# Patient Record
Sex: MALE | Race: OTHER | HISPANIC OR LATINO
[De-identification: names, ages, dates, MRNs, and addresses within clinical notes are randomized per-mention and may not be internally consistent; named-entity substitution may affect disease eponyms.]

---

## 2017-04-25 ENCOUNTER — APPOINTMENT (OUTPATIENT)
Dept: ORTHOPEDIC SURGERY | Facility: CLINIC | Age: 22
End: 2017-04-25

## 2017-04-25 VITALS
HEIGHT: 69 IN | WEIGHT: 196 LBS | BODY MASS INDEX: 29.03 KG/M2 | DIASTOLIC BLOOD PRESSURE: 76 MMHG | HEART RATE: 66 BPM | SYSTOLIC BLOOD PRESSURE: 126 MMHG

## 2017-04-25 DIAGNOSIS — S93.421A SPRAIN OF DELTOID LIGAMENT OF RIGHT ANKLE, INITIAL ENCOUNTER: ICD-10-CM

## 2017-04-25 DIAGNOSIS — Z78.9 OTHER SPECIFIED HEALTH STATUS: ICD-10-CM

## 2017-05-09 PROBLEM — Z78.9 DOES NOT USE ILLICIT DRUGS: Status: ACTIVE | Noted: 2017-04-25

## 2017-05-09 PROBLEM — Z78.9 EXERCISES DAILY: Status: ACTIVE | Noted: 2017-04-25

## 2017-05-09 PROBLEM — Z78.9 DENIES ALCOHOL CONSUMPTION: Status: ACTIVE | Noted: 2017-04-25

## 2017-06-01 ENCOUNTER — RESULT REVIEW (OUTPATIENT)
Age: 22
End: 2017-06-01

## 2017-06-14 ENCOUNTER — APPOINTMENT (OUTPATIENT)
Dept: ORTHOPEDIC SURGERY | Facility: CLINIC | Age: 22
End: 2017-06-14

## 2017-06-14 DIAGNOSIS — M21.6X9 OTHER ACQUIRED DEFORMITIES OF UNSPECIFIED FOOT: ICD-10-CM

## 2017-11-28 ENCOUNTER — OUTPATIENT (OUTPATIENT)
Dept: OUTPATIENT SERVICES | Facility: HOSPITAL | Age: 22
LOS: 1 days | End: 2017-11-28
Payer: MEDICAID

## 2017-11-28 VITALS
DIASTOLIC BLOOD PRESSURE: 76 MMHG | HEART RATE: 70 BPM | HEIGHT: 69 IN | SYSTOLIC BLOOD PRESSURE: 111 MMHG | OXYGEN SATURATION: 99 % | WEIGHT: 188.94 LBS | TEMPERATURE: 98 F | RESPIRATION RATE: 16 BRPM

## 2017-11-28 DIAGNOSIS — M95.8 OTHER SPECIFIED ACQUIRED DEFORMITIES OF MUSCULOSKELETAL SYSTEM: ICD-10-CM

## 2017-11-28 DIAGNOSIS — Z01.818 ENCOUNTER FOR OTHER PREPROCEDURAL EXAMINATION: ICD-10-CM

## 2017-11-28 DIAGNOSIS — M42.9: ICD-10-CM

## 2017-11-28 DIAGNOSIS — M77.51 OTHER ENTHESOPATHY OF RIGHT FOOT AND ANKLE: ICD-10-CM

## 2017-11-28 DIAGNOSIS — S99.919A UNSPECIFIED INJURY OF UNSPECIFIED ANKLE, INITIAL ENCOUNTER: ICD-10-CM

## 2017-11-28 LAB
HCT VFR BLD CALC: 46 % — SIGNIFICANT CHANGE UP (ref 39–50)
HGB BLD-MCNC: 15.6 G/DL — SIGNIFICANT CHANGE UP (ref 13–17)
MCHC RBC-ENTMCNC: 28 PG — SIGNIFICANT CHANGE UP (ref 27–34)
MCHC RBC-ENTMCNC: 33.9 GM/DL — SIGNIFICANT CHANGE UP (ref 32–36)
MCV RBC AUTO: 82.6 FL — SIGNIFICANT CHANGE UP (ref 80–100)
PLATELET # BLD AUTO: 275 K/UL — SIGNIFICANT CHANGE UP (ref 150–400)
RBC # BLD: 5.57 M/UL — SIGNIFICANT CHANGE UP (ref 4.2–5.8)
RBC # FLD: 13.2 % — SIGNIFICANT CHANGE UP (ref 10.3–14.5)
WBC # BLD: 7.72 K/UL — SIGNIFICANT CHANGE UP (ref 3.8–10.5)
WBC # FLD AUTO: 7.72 K/UL — SIGNIFICANT CHANGE UP (ref 3.8–10.5)

## 2017-11-28 PROCEDURE — G0463: CPT

## 2017-11-28 PROCEDURE — 85027 COMPLETE CBC AUTOMATED: CPT

## 2017-11-28 RX ORDER — ACETAMINOPHEN 500 MG
975 TABLET ORAL ONCE
Qty: 0 | Refills: 0 | Status: COMPLETED | OUTPATIENT
Start: 2017-12-01 | End: 2017-12-01

## 2017-11-28 RX ORDER — SODIUM CHLORIDE 9 MG/ML
3 INJECTION INTRAMUSCULAR; INTRAVENOUS; SUBCUTANEOUS EVERY 8 HOURS
Qty: 0 | Refills: 0 | Status: DISCONTINUED | OUTPATIENT
Start: 2017-12-01 | End: 2017-12-16

## 2017-11-28 RX ORDER — LIDOCAINE HCL 20 MG/ML
0.2 VIAL (ML) INJECTION ONCE
Qty: 0 | Refills: 0 | Status: DISCONTINUED | OUTPATIENT
Start: 2017-12-01 | End: 2017-12-16

## 2017-11-28 NOTE — H&P PST ADULT - HISTORY OF PRESENT ILLNESS
This is a 23 y/o male s/p ('2016) Right ankle "sprain" while running. No surgery. Progressively worsening Right ankle pain since. Re-evaluated (5/2017) dx: Right Ankle internal derangement. Scheduled: Right Ankle Operative Arthroscopy/ Potential Microfracture Technique.

## 2017-11-28 NOTE — H&P PST ADULT - NSANTHOSAYNRD_GEN_A_CORE
No. MILI screening performed.  STOP BANG Legend: 0-2 = LOW Risk; 3-4 = INTERMEDIATE Risk; 5-8 = HIGH Risk No. MILI screening performed.  STOP BANG Legend: 0-2 = LOW Risk; 3-4 = INTERMEDIATE Risk; 5-8 = HIGH Risk/Neck 15 in.

## 2017-12-01 ENCOUNTER — APPOINTMENT (OUTPATIENT)
Dept: ORTHOPEDIC SURGERY | Facility: HOSPITAL | Age: 22
End: 2017-12-01

## 2017-12-01 ENCOUNTER — OUTPATIENT (OUTPATIENT)
Dept: OUTPATIENT SERVICES | Facility: HOSPITAL | Age: 22
LOS: 1 days | End: 2017-12-01
Payer: MEDICAID

## 2017-12-01 ENCOUNTER — RESULT REVIEW (OUTPATIENT)
Age: 22
End: 2017-12-01

## 2017-12-01 VITALS
OXYGEN SATURATION: 99 % | HEART RATE: 70 BPM | HEIGHT: 69 IN | SYSTOLIC BLOOD PRESSURE: 125 MMHG | WEIGHT: 188.94 LBS | RESPIRATION RATE: 16 BRPM | TEMPERATURE: 98 F | DIASTOLIC BLOOD PRESSURE: 79 MMHG

## 2017-12-01 VITALS
RESPIRATION RATE: 16 BRPM | TEMPERATURE: 98 F | OXYGEN SATURATION: 100 % | HEART RATE: 70 BPM | DIASTOLIC BLOOD PRESSURE: 71 MMHG | SYSTOLIC BLOOD PRESSURE: 120 MMHG

## 2017-12-01 DIAGNOSIS — M77.51 OTHER ENTHESOPATHY OF RIGHT FOOT AND ANKLE: ICD-10-CM

## 2017-12-01 DIAGNOSIS — M42.9: ICD-10-CM

## 2017-12-01 DIAGNOSIS — M95.8 OTHER SPECIFIED ACQUIRED DEFORMITIES OF MUSCULOSKELETAL SYSTEM: ICD-10-CM

## 2017-12-01 DIAGNOSIS — Z01.818 ENCOUNTER FOR OTHER PREPROCEDURAL EXAMINATION: ICD-10-CM

## 2017-12-01 PROCEDURE — 88311 DECALCIFY TISSUE: CPT

## 2017-12-01 PROCEDURE — 88311 DECALCIFY TISSUE: CPT | Mod: 26

## 2017-12-01 PROCEDURE — 88304 TISSUE EXAM BY PATHOLOGIST: CPT | Mod: 26

## 2017-12-01 PROCEDURE — 29894 ANKLE ARTHROSCOPY/SURGERY: CPT | Mod: RT

## 2017-12-01 PROCEDURE — 29891 ARTHR ANK OSTCHN DF TAL&/TIB: CPT | Mod: RT

## 2017-12-01 PROCEDURE — 88304 TISSUE EXAM BY PATHOLOGIST: CPT

## 2017-12-01 RX ORDER — OXYCODONE HYDROCHLORIDE 5 MG/1
10 TABLET ORAL ONCE
Qty: 0 | Refills: 0 | Status: DISCONTINUED | OUTPATIENT
Start: 2017-12-01 | End: 2017-12-01

## 2017-12-01 RX ORDER — SODIUM CHLORIDE 9 MG/ML
1000 INJECTION, SOLUTION INTRAVENOUS
Qty: 0 | Refills: 0 | Status: DISCONTINUED | OUTPATIENT
Start: 2017-12-01 | End: 2017-12-16

## 2017-12-01 RX ORDER — CELECOXIB 200 MG/1
200 CAPSULE ORAL ONCE
Qty: 0 | Refills: 0 | Status: DISCONTINUED | OUTPATIENT
Start: 2017-12-01 | End: 2017-12-16

## 2017-12-01 RX ORDER — OXYCODONE HYDROCHLORIDE 5 MG/1
5 TABLET ORAL ONCE
Qty: 0 | Refills: 0 | Status: DISCONTINUED | OUTPATIENT
Start: 2017-12-01 | End: 2017-12-01

## 2017-12-01 RX ORDER — HYDROMORPHONE HYDROCHLORIDE 2 MG/ML
0.25 INJECTION INTRAMUSCULAR; INTRAVENOUS; SUBCUTANEOUS
Qty: 0 | Refills: 0 | Status: DISCONTINUED | OUTPATIENT
Start: 2017-12-01 | End: 2017-12-01

## 2017-12-01 RX ORDER — FAMOTIDINE 10 MG/ML
0 INJECTION INTRAVENOUS
Qty: 0 | Refills: 0 | COMMUNITY

## 2017-12-01 RX ORDER — CELECOXIB 200 MG/1
200 CAPSULE ORAL ONCE
Qty: 0 | Refills: 0 | Status: COMPLETED | OUTPATIENT
Start: 2017-12-01 | End: 2017-12-01

## 2017-12-01 RX ORDER — ONDANSETRON 8 MG/1
4 TABLET, FILM COATED ORAL ONCE
Qty: 0 | Refills: 0 | Status: DISCONTINUED | OUTPATIENT
Start: 2017-12-01 | End: 2017-12-16

## 2017-12-01 RX ADMIN — Medication 975 MILLIGRAM(S): at 12:20

## 2017-12-01 RX ADMIN — CELECOXIB 200 MILLIGRAM(S): 200 CAPSULE ORAL at 12:20

## 2017-12-01 NOTE — ASU DISCHARGE PLAN (ADULT/PEDIATRIC). - ITEMS TO FOLLOWUP WITH YOUR PHYSICIAN'S
Please follow up with Dr. Butterfield within 1-2 weeks. You may call [phone] to schedule an appointment.    You may resume a regular diet and regular activities. Please do not participate in heavy lifting or strenuous exercise. Do not drive while taking pain medication.    Please go to the emergency department if you experience pain not controlled by pain medication, bleeding that will not stop, fevers or chills. Please follow up with Dr. Butterfield within 1-2 weeks. You may call [phone] to schedule an appointment.    You may resume a regular diet and regular activities. Please do not participate in heavy lifting or strenuous exercise. Do not drive while taking pain medication. Please remain non-weight bearing in splint.    Please go to the emergency department if you experience pain not controlled by pain medication, bleeding that will not stop, fevers or chills.

## 2017-12-01 NOTE — PRE-ANESTHESIA EVALUATION ADULT - NSANTHOSAYNRD_GEN_A_CORE
No. MILI screening performed.  STOP BANG Legend: 0-2 = LOW Risk; 3-4 = INTERMEDIATE Risk; 5-8 = HIGH Risk/Neck 15 in.

## 2017-12-01 NOTE — ASU DISCHARGE PLAN (ADULT/PEDIATRIC). - NOTIFY
Pain not relieved by Medications/Bleeding that does not stop/Fever greater than 101 Pain not relieved by Medications/Fever greater than 101/Inability to Tolerate Liquids or Foods/Persistent Nausea and Vomiting/Unable to Urinate/Swelling that continues/Bleeding that does not stop

## 2017-12-01 NOTE — ASU DISCHARGE PLAN (ADULT/PEDIATRIC). - FOLLOWUP APPOINTMENT CLINIC/PHYSICIAN
Please follow up with Dr. Butterfield within 1-2 weeks. You may call [phone] to schedule an appointment.

## 2017-12-01 NOTE — PRE-ANESTHESIA EVALUATION ADULT - NSANTHADDINFOFT_GEN_ALL_CORE
Denies any neuro deficits or lumbar spine pathology  RBA of regional -popliteal fossa, saphenous nerve block explained to  patient . He understood agreed.

## 2017-12-05 ENCOUNTER — TRANSCRIPTION ENCOUNTER (OUTPATIENT)
Age: 22
End: 2017-12-05

## 2017-12-11 LAB — SURGICAL PATHOLOGY STUDY: SIGNIFICANT CHANGE UP

## 2017-12-13 ENCOUNTER — APPOINTMENT (OUTPATIENT)
Dept: ORTHOPEDIC SURGERY | Facility: CLINIC | Age: 22
End: 2017-12-13

## 2017-12-13 ENCOUNTER — APPOINTMENT (OUTPATIENT)
Dept: ORTHOPEDIC SURGERY | Facility: CLINIC | Age: 22
End: 2017-12-13
Payer: MEDICAID

## 2017-12-13 PROCEDURE — 99024 POSTOP FOLLOW-UP VISIT: CPT

## 2018-01-23 ENCOUNTER — APPOINTMENT (OUTPATIENT)
Dept: ORTHOPEDIC SURGERY | Facility: CLINIC | Age: 23
End: 2018-01-23
Payer: MEDICAID

## 2018-01-23 DIAGNOSIS — M95.8 OTHER SPECIFIED ACQUIRED DEFORMITIES OF MUSCULOSKELETAL SYSTEM: ICD-10-CM

## 2018-01-23 PROCEDURE — 99024 POSTOP FOLLOW-UP VISIT: CPT

## 2018-03-14 ENCOUNTER — APPOINTMENT (OUTPATIENT)
Dept: ORTHOPEDIC SURGERY | Facility: CLINIC | Age: 23
End: 2018-03-14
Payer: MEDICAID

## 2018-03-14 DIAGNOSIS — M25.871 OTHER SPECIFIED JOINT DISORDERS, RIGHT ANKLE AND FOOT: ICD-10-CM

## 2018-03-14 DIAGNOSIS — M24.9 JOINT DERANGEMENT, UNSPECIFIED: ICD-10-CM

## 2018-03-14 PROCEDURE — 99213 OFFICE O/P EST LOW 20 MIN: CPT

## 2018-03-25 PROBLEM — M24.9 INTERNAL DERANGEMENT OF ANKLE: Status: ACTIVE | Noted: 2017-06-14

## 2018-03-25 PROBLEM — M25.871 IMPINGEMENT SYNDROME OF RIGHT ANKLE: Status: ACTIVE | Noted: 2017-06-14

## 2018-07-01 ENCOUNTER — EMERGENCY (EMERGENCY)
Facility: HOSPITAL | Age: 23
LOS: 1 days | Discharge: HOME | End: 2018-07-01
Attending: EMERGENCY MEDICINE | Admitting: EMERGENCY MEDICINE
Payer: MEDICAID

## 2018-07-01 VITALS
TEMPERATURE: 98 F | SYSTOLIC BLOOD PRESSURE: 146 MMHG | HEART RATE: 73 BPM | DIASTOLIC BLOOD PRESSURE: 89 MMHG | OXYGEN SATURATION: 99 % | RESPIRATION RATE: 16 BRPM

## 2018-07-01 PROCEDURE — 99283 EMERGENCY DEPT VISIT LOW MDM: CPT

## 2018-07-01 RX ORDER — KETOROLAC TROMETHAMINE 30 MG/ML
30 SYRINGE (ML) INJECTION ONCE
Qty: 0 | Refills: 0 | Status: DISCONTINUED | OUTPATIENT
Start: 2018-07-01 | End: 2018-07-01

## 2018-07-01 RX ADMIN — Medication 30 MILLIGRAM(S): at 12:45

## 2018-07-01 NOTE — ED PROVIDER NOTE - PHYSICAL EXAMINATION
Gen: Well appearing, well nourished, awake, alert, oriented to person, place, time/situation and in no apparent distress.  ENMT: R tonsillar swelling. No exudates. No peritonsillar abscess. Uvula is midline. No stridor.  Cardiac: Normal rate, regular rhythm.  Heart sounds S1, S2.  Respiratory: Breath sounds clear and equal bilaterally. No wheezes/rales/rhonchi.  Abdomen: Abdomen soft, non-distended, non-tender, no guarding.  Musculoskeletal: Atraumatic. No vascular compromise.  Neuro: Alert, follows commands. Speech is clear, fluent, and appropriate. Moving all extremities spontaneously.  Skin: Skin normal color for race, warm, dry and intact. No evidence of rash.

## 2018-07-01 NOTE — ED PROVIDER NOTE - PLAN OF CARE
-- Use cepacol as instructed.  -- Take motrin (also known as ibuprofen or aleve) 600mg every 6 hours as needed for pain. Take motrin with meals. Do not exceed 2400mg of motrin in 24 hours. Or you may take tylenol 650mg every 6 hours for pain. Do not take more than 2600mg of tylenol in 24 hours. Tylenol and motrin do not interact and are safe to use together.   -- Follow up with your primary doctor in 3-4 days.  -- Return to ER immediately for new or worsening symptoms, any urgent issues, or for any concerns.

## 2018-07-01 NOTE — ED PROVIDER NOTE - MEDICAL DECISION MAKING DETAILS
Pt with tonsillitis. Already on abx. No peritonsillar abscess. Will give symptomatic treatment, anticipate discharge with close follow up. Pt with tonsillitis. Already on abx. No evidence of large peritonsillar abscess at this time. Will give symptomatic treatment, c/w ABx, anticipate discharge with close follow up.

## 2018-07-01 NOTE — ED PROVIDER NOTE - CARE PLAN
Principal Discharge DX:	Tonsillitis  Assessment and plan of treatment:	-- Use cepacol as instructed.  -- Take motrin (also known as ibuprofen or aleve) 600mg every 6 hours as needed for pain. Take motrin with meals. Do not exceed 2400mg of motrin in 24 hours. Or you may take tylenol 650mg every 6 hours for pain. Do not take more than 2600mg of tylenol in 24 hours. Tylenol and motrin do not interact and are safe to use together.   -- Follow up with your primary doctor in 3-4 days.  -- Return to ER immediately for new or worsening symptoms, any urgent issues, or for any concerns.

## 2018-07-01 NOTE — ED PROVIDER NOTE - ATTENDING CONTRIBUTION TO CARE
Attending Attestation: Dr. Toro  I have personally performed a history and physical examination of the patient and discussed management with the resident as well as the patient.  I reviewed the resident's note and agree with the documented findings and plan of care.  I have authored and modified critical sections of the Provider Note, including but not limited to HPI, Physical Exam and MDM. Pt with tonsillitis. Already on abx. No evidence of large peritonsillar abscess at this time. Will give symptomatic treatment, c/w ABx, anticipate discharge with close follow up.

## 2018-07-01 NOTE — ED PROVIDER NOTE - OBJECTIVE STATEMENT
22M no sig PMH p/w sore throat x 4 days. Pt seen at urgent care 4 days ago, started on amoxicillin and low dose steroids. Comes to ED because his sore throat has not improved. Taking ibuprofen 400mg for pain. No fever, difficulty breathing, difficulty swallowing, N/V, cough, or chest pain. 22M no sig PMH p/w sore throat x 4 days (PTA 8 yrs ago). Pt seen at urgent care 4 days ago, started on amoxicillin and low dose steroids. Comes to ED because his sore throat has not improved - he was told he would be better in 48 hrs. Taking ibuprofen 400mg for pain. Also using a Lido spray (friend's) with relief. No fever, difficulty breathing, difficulty swallowing, N/V, cough, or chest pain.  Able to swallow well.  Voice is somewhat muffled.

## 2018-07-01 NOTE — ED ADULT TRIAGE NOTE - CHIEF COMPLAINT QUOTE
Pt complaining of throat pain since Wednesday, was seen at an urgent care and given Amoxicillin and steroids but is not feeling better. Pt denies fever or chills.

## 2018-07-01 NOTE — ED PROVIDER NOTE - PROGRESS NOTE DETAILS
MIldly swollen right tonsil, no uvular deviation. Does not appear to have PTA at this time, risks of I&D outweight potential benefits.  Pt to go home with ABx and cepacol, Ibuprofen 600mg and close f/u.  Understands he his at risk for PTA, may require I&D if not improving, and will return for fevers/worsening/trismus, etc. Mother at bedside in agreement.

## 2019-05-16 PROBLEM — L70.9 ACNE, UNSPECIFIED: Chronic | Status: ACTIVE | Noted: 2017-11-28

## 2019-05-16 PROBLEM — J36 PERITONSILLAR ABSCESS: Chronic | Status: ACTIVE | Noted: 2018-07-01

## 2019-05-16 PROBLEM — S99.919A UNSPECIFIED INJURY OF UNSPECIFIED ANKLE, INITIAL ENCOUNTER: Chronic | Status: ACTIVE | Noted: 2017-11-28

## 2019-05-20 ENCOUNTER — APPOINTMENT (OUTPATIENT)
Dept: ORTHOPEDIC SURGERY | Facility: CLINIC | Age: 24
End: 2019-05-20

## 2020-03-10 NOTE — ASU PREOP CHECKLIST - HEIGHT IN INCHES
The 10-year ASCVD risk score (Bridger SOLOMON Jr., et al., 2013) is: 26%  Patient continues to decline statin, has been working on lifestyle modification and has improved her LDL from 150 down to 130, she does understand the risks of not taking cholesterol-lowering medication, she wishes to continue with lifestyle modifications.   9

## 2021-04-24 ENCOUNTER — TRANSCRIPTION ENCOUNTER (OUTPATIENT)
Age: 26
End: 2021-04-24

## 2021-07-20 NOTE — ASU PATIENT PROFILE, ADULT - TEACHING/LEARNING RELIGIOUS CONSIDERATIONS
Detail Level: Zone Detail Level: Detailed Detail Level: Generalized Detail Level: Simple none Minocycline Counseling: Patient advised regarding possible photosensitivity and discoloration of the teeth, skin, lips, tongue and gums.  Patient instructed to avoid sunlight, if possible.  When exposed to sunlight, patients should wear protective clothing, sunglasses, and sunscreen.  The patient was instructed to call the office immediately if the following severe adverse effects occur:  hearing changes, easy bruising/bleeding, severe headache, or vision changes.  The patient verbalized understanding of the proper use and possible adverse effects of minocycline.  All of the patient's questions and concerns were addressed.\\n\\nDiscussed antibiotic stewardship with patient and associated risks with long term minocycline use. Patient is aware of risk associated and states no other treatment options have worked for her in the past. Stressed importance of regular lab monitoring while on long term minocycline. Patient denies any pigment changes, hearing changes, or dizziness. Instructed patient to continue to taking one week off each month. Benzoyl Peroxide Pregnancy And Lactation Text: This medication is Pregnancy Category C. It is unknown if benzoyl peroxide is excreted in breast milk. Erythromycin Pregnancy And Lactation Text: This medication is Pregnancy Category B and is considered safe during pregnancy. It is also excreted in breast milk. Dapsone Pregnancy And Lactation Text: This medication is Pregnancy Category C and is not considered safe during pregnancy or breast feeding. Tazorac Pregnancy And Lactation Text: This medication is not safe during pregnancy. It is unknown if this medication is excreted in breast milk. Dapsone Counseling: I discussed with the patient the risks of dapsone including but not limited to hemolytic anemia, agranulocytosis, rashes, methemoglobinemia, kidney failure, peripheral neuropathy, headaches, GI upset, and liver toxicity.  Patients who start dapsone require monitoring including baseline LFTs and weekly CBCs for the first month, then every month thereafter.  The patient verbalized understanding of the proper use and possible adverse effects of dapsone.  All of the patient's questions and concerns were addressed. Spironolactone Counseling: Patient advised regarding risks of diarrhea, abdominal pain, hyperkalemia, birth defects (for female patients), liver toxicity and renal toxicity. The patient may need blood work to monitor liver and kidney function and potassium levels while on therapy. The patient verbalized understanding of the proper use and possible adverse effects of spironolactone.  All of the patient's questions and concerns were addressed. Bactrim Counseling:  I discussed with the patient the risks of sulfa antibiotics including but not limited to GI upset, allergic reaction, drug rash, diarrhea, dizziness, photosensitivity, and yeast infections.  Rarely, more serious reactions can occur including but not limited to aplastic anemia, agranulocytosis, methemoglobinemia, blood dyscrasias, liver or kidney failure, lung infiltrates or desquamative/blistering drug rashes. Use Enhanced Medication Counseling?: Yes Topical Retinoid counseling:  Patient advised to apply a pea-sized amount only at bedtime and wait 30 minutes after washing their face before applying.  If too drying, patient may add a non-comedogenic moisturizer. The patient verbalized understanding of the proper use and possible adverse effects of retinoids.  All of the patient's questions and concerns were addressed. Doxycycline Counseling:  Patient counseled regarding possible photosensitivity and increased risk for sunburn.  Patient instructed to avoid sunlight, if possible.  When exposed to sunlight, patients should wear protective clothing, sunglasses, and sunscreen.  The patient was instructed to call the office immediately if the following severe adverse effects occur:  hearing changes, easy bruising/bleeding, severe headache, or vision changes.  The patient verbalized understanding of the proper use and possible adverse effects of doxycycline.  All of the patient's questions and concerns were addressed.\\n\\nDiscussed antibiotic stewardship with patient and associated risks with long term doxycycline use. Patient is aware of risk associated and states no other treatment op Stressed importance of regular lab monitoring while on long term doxycycline. Bactrim Pregnancy And Lactation Text: This medication is Pregnancy Category D and is known to cause fetal risk.  It is also excreted in breast milk. Minocycline Pregnancy And Lactation Text: This medication is Pregnancy Category D and not consider safe during pregnancy. It is also excreted in breast milk. Include Pregnancy/Lactation Warning?: No Topical Clindamycin Pregnancy And Lactation Text: This medication is Pregnancy Category B and is considered safe during pregnancy. It is unknown if it is excreted in breast milk. Spironolactone Pregnancy And Lactation Text: This medication can cause feminization of the male fetus and should be avoided during pregnancy. The active metabolite is also found in breast milk. Topical Clindamycin Counseling: Patient counseled that this medication may cause skin irritation or allergic reactions.  In the event of skin irritation, the patient was advised to reduce the amount of the drug applied or use it less frequently.   The patient verbalized understanding of the proper use and possible adverse effects of clindamycin.  All of the patient's questions and concerns were addressed. Isotretinoin Counseling: Patient should get monthly blood tests, not donate blood, not drive at night if vision affected, not share medication, and not undergo elective surgery for 6 months after tx completed. Side effects reviewed, pt to contact office should one occur. Sarecycline Counseling: Patient advised regarding possible photosensitivity and discoloration of the teeth, skin, lips, tongue and gums.  Patient instructed to avoid sunlight, if possible.  When exposed to sunlight, patients should wear protective clothing, sunglasses, and sunscreen.  The patient was instructed to call the office immediately if the following severe adverse effects occur:  hearing changes, easy bruising/bleeding, severe headache, or vision changes.  The patient verbalized understanding of the proper use and possible adverse effects of sarecycline.  All of the patient's questions and concerns were addressed. Topical Sulfur Applications Counseling: Topical Sulfur Counseling: Patient counseled that this medication may cause skin irritation or allergic reactions.  In the event of skin irritation, the patient was advised to reduce the amount of the drug applied or use it less frequently.   The patient verbalized understanding of the proper use and possible adverse effects of topical sulfur application.  All of the patient's questions and concerns were addressed. Azithromycin Counseling:  I discussed with the patient the risks of azithromycin including but not limited to GI upset, allergic reaction, drug rash, diarrhea, and yeast infections. Topical Retinoid Pregnancy And Lactation Text: This medication is Pregnancy Category C. It is unknown if this medication is excreted in breast milk. Isotretinoin Pregnancy And Lactation Text: This medication is Pregnancy Category X and is considered extremely dangerous during pregnancy. It is unknown if it is excreted in breast milk. Tetracycline Counseling: Patient counseled regarding possible photosensitivity and increased risk for sunburn.  Patient instructed to avoid sunlight, if possible.  When exposed to sunlight, patients should wear protective clothing, sunglasses, and sunscreen.  The patient was instructed to call the office immediately if the following severe adverse effects occur:  hearing changes, easy bruising/bleeding, severe headache, or vision changes.  The patient verbalized understanding of the proper use and possible adverse effects of tetracycline.  All of the patient's questions and concerns were addressed. Patient understands to avoid pregnancy while on therapy due to potential birth defects. Doxycycline Pregnancy And Lactation Text: This medication is Pregnancy Category D and not consider safe during pregnancy. It is also excreted in breast milk but is considered safe for shorter treatment courses. Birth Control Pills Counseling: Birth Control Pill Counseling: I discussed with the patient the potential side effects of OCPs including but not limited to increased risk of stroke, heart attack, thrombophlebitis, deep venous thrombosis, hepatic adenomas, breast changes, GI upset, headaches, and depression.  The patient verbalized understanding of the proper use and possible adverse effects of OCPs. All of the patient's questions and concerns were addressed. Benzoyl Peroxide Counseling: Patient counseled that medicine may cause skin irritation and bleach clothing.  In the event of skin irritation, the patient was advised to reduce the amount of the drug applied or use it less frequently.   The patient verbalized understanding of the proper use and possible adverse effects of benzoyl peroxide.  All of the patient's questions and concerns were addressed. Topical Sulfur Applications Pregnancy And Lactation Text: This medication is Pregnancy Category C and has an unknown safety profile during pregnancy. It is unknown if this topical medication is excreted in breast milk. High Dose Vitamin A Pregnancy And Lactation Text: High dose vitamin A therapy is contraindicated during pregnancy and breast feeding. Birth Control Pills Pregnancy And Lactation Text: This medication should be avoided if pregnant and for the first 30 days post-partum. High Dose Vitamin A Counseling: Side effects reviewed, pt to contact office should one occur. Azithromycin Pregnancy And Lactation Text: This medication is considered safe during pregnancy and is also secreted in breast milk. Tazorac Counseling:  Patient advised that medication is irritating and drying.  Patient may need to apply sparingly and wash off after an hour before eventually leaving it on overnight.  The patient verbalized understanding of the proper use and possible adverse effects of tazorac.  All of the patient's questions and concerns were addressed. Erythromycin Counseling:  I discussed with the patient the risks of erythromycin including but not limited to GI upset, allergic reaction, drug rash, diarrhea, increase in liver enzymes, and yeast infections.

## 2022-03-12 ENCOUNTER — TRANSCRIPTION ENCOUNTER (OUTPATIENT)
Age: 27
End: 2022-03-12

## 2022-08-12 ENCOUNTER — RESULT REVIEW (OUTPATIENT)
Age: 27
End: 2022-08-12

## 2022-08-14 ENCOUNTER — INPATIENT (INPATIENT)
Facility: HOSPITAL | Age: 27
LOS: 5 days | Discharge: HOME | End: 2022-08-20
Attending: SURGERY | Admitting: SURGERY

## 2022-08-14 ENCOUNTER — TRANSCRIPTION ENCOUNTER (OUTPATIENT)
Age: 27
End: 2022-08-14

## 2022-08-14 VITALS
TEMPERATURE: 97 F | OXYGEN SATURATION: 100 % | HEART RATE: 82 BPM | HEIGHT: 69 IN | SYSTOLIC BLOOD PRESSURE: 142 MMHG | DIASTOLIC BLOOD PRESSURE: 83 MMHG | RESPIRATION RATE: 16 BRPM

## 2022-08-14 DIAGNOSIS — K62.89 OTHER SPECIFIED DISEASES OF ANUS AND RECTUM: ICD-10-CM

## 2022-08-14 LAB
ALBUMIN SERPL ELPH-MCNC: 4.5 G/DL — SIGNIFICANT CHANGE UP (ref 3.3–5)
ALP SERPL-CCNC: 135 U/L — HIGH (ref 40–120)
ALT FLD-CCNC: 21 U/L — SIGNIFICANT CHANGE UP (ref 4–41)
ANION GAP SERPL CALC-SCNC: 12 MMOL/L — SIGNIFICANT CHANGE UP (ref 7–14)
APTT BLD: 31 SEC — SIGNIFICANT CHANGE UP (ref 27–36.3)
AST SERPL-CCNC: 17 U/L — SIGNIFICANT CHANGE UP (ref 4–40)
BASOPHILS # BLD AUTO: 0.05 K/UL — SIGNIFICANT CHANGE UP (ref 0–0.2)
BASOPHILS NFR BLD AUTO: 0.6 % — SIGNIFICANT CHANGE UP (ref 0–2)
BILIRUB SERPL-MCNC: 0.3 MG/DL — SIGNIFICANT CHANGE UP (ref 0.2–1.2)
BLD GP AB SCN SERPL QL: NEGATIVE — SIGNIFICANT CHANGE UP
BUN SERPL-MCNC: 16 MG/DL — SIGNIFICANT CHANGE UP (ref 7–23)
CALCIUM SERPL-MCNC: 9.2 MG/DL — SIGNIFICANT CHANGE UP (ref 8.4–10.5)
CHLORIDE SERPL-SCNC: 103 MMOL/L — SIGNIFICANT CHANGE UP (ref 98–107)
CO2 SERPL-SCNC: 26 MMOL/L — SIGNIFICANT CHANGE UP (ref 22–31)
CREAT SERPL-MCNC: 0.78 MG/DL — SIGNIFICANT CHANGE UP (ref 0.5–1.3)
EGFR: 126 ML/MIN/1.73M2 — SIGNIFICANT CHANGE UP
EOSINOPHIL # BLD AUTO: 0.19 K/UL — SIGNIFICANT CHANGE UP (ref 0–0.5)
EOSINOPHIL NFR BLD AUTO: 2.4 % — SIGNIFICANT CHANGE UP (ref 0–6)
GLUCOSE SERPL-MCNC: 109 MG/DL — HIGH (ref 70–99)
HCT VFR BLD CALC: 45.7 % — SIGNIFICANT CHANGE UP (ref 39–50)
HGB BLD-MCNC: 14.3 G/DL — SIGNIFICANT CHANGE UP (ref 13–17)
IANC: 5.11 K/UL — SIGNIFICANT CHANGE UP (ref 1.8–7.4)
IMM GRANULOCYTES NFR BLD AUTO: 0.4 % — SIGNIFICANT CHANGE UP (ref 0–1.5)
INR BLD: 1.07 RATIO — SIGNIFICANT CHANGE UP (ref 0.88–1.16)
LYMPHOCYTES # BLD AUTO: 2.02 K/UL — SIGNIFICANT CHANGE UP (ref 1–3.3)
LYMPHOCYTES # BLD AUTO: 25.1 % — SIGNIFICANT CHANGE UP (ref 13–44)
MCHC RBC-ENTMCNC: 25.4 PG — LOW (ref 27–34)
MCHC RBC-ENTMCNC: 31.3 GM/DL — LOW (ref 32–36)
MCV RBC AUTO: 81 FL — SIGNIFICANT CHANGE UP (ref 80–100)
MONOCYTES # BLD AUTO: 0.64 K/UL — SIGNIFICANT CHANGE UP (ref 0–0.9)
MONOCYTES NFR BLD AUTO: 8 % — SIGNIFICANT CHANGE UP (ref 2–14)
NEUTROPHILS # BLD AUTO: 5.11 K/UL — SIGNIFICANT CHANGE UP (ref 1.8–7.4)
NEUTROPHILS NFR BLD AUTO: 63.5 % — SIGNIFICANT CHANGE UP (ref 43–77)
NRBC # BLD: 0 /100 WBCS — SIGNIFICANT CHANGE UP
NRBC # FLD: 0 K/UL — SIGNIFICANT CHANGE UP
PLATELET # BLD AUTO: 378 K/UL — SIGNIFICANT CHANGE UP (ref 150–400)
POTASSIUM SERPL-MCNC: 4.1 MMOL/L — SIGNIFICANT CHANGE UP (ref 3.5–5.3)
POTASSIUM SERPL-SCNC: 4.1 MMOL/L — SIGNIFICANT CHANGE UP (ref 3.5–5.3)
PROT SERPL-MCNC: 7.9 G/DL — SIGNIFICANT CHANGE UP (ref 6–8.3)
PROTHROM AB SERPL-ACNC: 12.4 SEC — SIGNIFICANT CHANGE UP (ref 10.5–13.4)
RBC # BLD: 5.64 M/UL — SIGNIFICANT CHANGE UP (ref 4.2–5.8)
RBC # FLD: 13.2 % — SIGNIFICANT CHANGE UP (ref 10.3–14.5)
RH IG SCN BLD-IMP: POSITIVE — SIGNIFICANT CHANGE UP
SARS-COV-2 RNA SPEC QL NAA+PROBE: SIGNIFICANT CHANGE UP
SODIUM SERPL-SCNC: 141 MMOL/L — SIGNIFICANT CHANGE UP (ref 135–145)
WBC # BLD: 8.04 K/UL — SIGNIFICANT CHANGE UP (ref 3.8–10.5)
WBC # FLD AUTO: 8.04 K/UL — SIGNIFICANT CHANGE UP (ref 3.8–10.5)

## 2022-08-14 PROCEDURE — 93010 ELECTROCARDIOGRAM REPORT: CPT

## 2022-08-14 PROCEDURE — 99285 EMERGENCY DEPT VISIT HI MDM: CPT | Mod: 25

## 2022-08-14 RX ORDER — SOD SULF/SODIUM/NAHCO3/KCL/PEG
2000 SOLUTION, RECONSTITUTED, ORAL ORAL ONCE
Refills: 0 | Status: DISCONTINUED | OUTPATIENT
Start: 2022-08-14 | End: 2022-08-14

## 2022-08-14 RX ORDER — SODIUM CHLORIDE 9 MG/ML
1000 INJECTION, SOLUTION INTRAVENOUS
Refills: 0 | Status: DISCONTINUED | OUTPATIENT
Start: 2022-08-14 | End: 2022-08-15

## 2022-08-14 RX ORDER — SODIUM CHLORIDE 9 MG/ML
1000 INJECTION, SOLUTION INTRAVENOUS
Refills: 0 | Status: DISCONTINUED | OUTPATIENT
Start: 2022-08-14 | End: 2022-08-14

## 2022-08-14 RX ORDER — ENOXAPARIN SODIUM 100 MG/ML
40 INJECTION SUBCUTANEOUS EVERY 24 HOURS
Refills: 0 | Status: DISCONTINUED | OUTPATIENT
Start: 2022-08-14 | End: 2022-08-15

## 2022-08-14 RX ORDER — SODIUM CHLORIDE 9 MG/ML
1000 INJECTION, SOLUTION INTRAVENOUS
Refills: 0 | Status: DISCONTINUED | OUTPATIENT
Start: 2022-08-15 | End: 2022-08-19

## 2022-08-14 RX ORDER — SOD SULF/SODIUM/NAHCO3/KCL/PEG
1000 SOLUTION, RECONSTITUTED, ORAL ORAL
Refills: 0 | Status: COMPLETED | OUTPATIENT
Start: 2022-08-14 | End: 2022-08-15

## 2022-08-14 RX ORDER — METRONIDAZOLE 500 MG
250 TABLET ORAL EVERY 8 HOURS
Refills: 0 | Status: COMPLETED | OUTPATIENT
Start: 2022-08-14 | End: 2022-08-15

## 2022-08-14 RX ORDER — NEOMYCIN SULFATE 500 MG/1
500 TABLET ORAL EVERY 8 HOURS
Refills: 0 | Status: COMPLETED | OUTPATIENT
Start: 2022-08-14 | End: 2022-08-15

## 2022-08-14 RX ORDER — SODIUM CHLORIDE 9 MG/ML
1000 INJECTION, SOLUTION INTRAVENOUS ONCE
Refills: 0 | Status: COMPLETED | OUTPATIENT
Start: 2022-08-14 | End: 2022-08-14

## 2022-08-14 RX ADMIN — NEOMYCIN SULFATE 500 MILLIGRAM(S): 500 TABLET ORAL at 15:38

## 2022-08-14 RX ADMIN — SODIUM CHLORIDE 75 MILLILITER(S): 9 INJECTION, SOLUTION INTRAVENOUS at 22:28

## 2022-08-14 RX ADMIN — SODIUM CHLORIDE 75 MILLILITER(S): 9 INJECTION, SOLUTION INTRAVENOUS at 18:05

## 2022-08-14 RX ADMIN — ENOXAPARIN SODIUM 40 MILLIGRAM(S): 100 INJECTION SUBCUTANEOUS at 13:57

## 2022-08-14 RX ADMIN — Medication 250 MILLIGRAM(S): at 21:56

## 2022-08-14 RX ADMIN — SODIUM CHLORIDE 75 MILLILITER(S): 9 INJECTION, SOLUTION INTRAVENOUS at 13:56

## 2022-08-14 RX ADMIN — NEOMYCIN SULFATE 500 MILLIGRAM(S): 500 TABLET ORAL at 21:56

## 2022-08-14 RX ADMIN — Medication 1000 MILLILITER(S): at 15:41

## 2022-08-14 RX ADMIN — Medication 250 MILLIGRAM(S): at 13:57

## 2022-08-14 RX ADMIN — SODIUM CHLORIDE 125 MILLILITER(S): 9 INJECTION, SOLUTION INTRAVENOUS at 11:00

## 2022-08-14 NOTE — H&P ADULT - NSHPPHYSICALEXAM_GEN_ALL_CORE
General: NAD  HEENT: NC/AT  Respiratory: RA, no increased work of breathing  Cardiovascular: RRR  Abdomen: soft, non tender, non distended  Neuro: A/Ox4, no focal deficits  Extremities: ZO  Skin: intact, breakdown

## 2022-08-14 NOTE — H&P ADULT - NSHPLABSRESULTS_GEN_ALL_CORE
14.3   8.04  )-----------( 378      ( 14 Aug 2022 10:03 )             45.7     08-14-22 @ 10:03    141  |  103  |  16             --------------------------< 109<H>     4.1  |  26  | 0.78    eGFR AA: --  eGFR N-AA: --    Calcium: 9.2  Phosphorus: --  Magnesium: --    AST: 17    ALT: 21  AlkPhos: 135<H>  Protein: 7.9  Albumin: 4.5  TBili: 0.3  D-Bili: --

## 2022-08-14 NOTE — ED ADULT NURSE NOTE - NS ED NURSE RECORD ANOTHER VITAL SIGN
CC:  Chief Complaint   Patient presents with    Cough    Sore Throat       HPI: Magdalena Avila is a 8  y.o. 10  m.o. here today with mother for evaluation of cough and sore throat.     Began to have sore throat 2 days ago.   No fever  No congestion  No headache, no abdominal pain  Cough began several hours ago - dry   No vomiting, no diarrhea  Eating and drinking less, normal urine output     HPI    Past Medical History:   Diagnosis Date    Allergy     Hypertrophy of tonsils with hypertrophy of adenoids     s/p adenotonsillectomy in 2013         Current Outpatient Prescriptions:     fluticasone (CUTIVATE) 0.05 % cream, APPLY TO ECZEMA TWICE DAILY FOR 7 TO 10 DAYS, Disp: 30 g, Rfl: 2    loratadine (CLARITIN) 5 mg/5 mL syrup, Take 5 mLs (5 mg total) by mouth once daily., Disp: 150 mL, Rfl: 2    Review of Systems   Constitutional: Negative for activity change, appetite change and fever.   HENT: Positive for sore throat. Negative for congestion, ear discharge, ear pain, postnasal drip, rhinorrhea, sinus pain and sneezing.    Eyes: Negative for redness.   Respiratory: Positive for cough.    Gastrointestinal: Negative for abdominal pain and vomiting.   Neurological: Negative for headaches.       PE:   Vitals:    01/08/18 1518   Resp: 20   Temp: 99.2 °F (37.3 °C)       Physical Exam   Constitutional: She is active. No distress.   HENT:   Right Ear: Tympanic membrane normal.   Left Ear: Tympanic membrane normal.   Nose: Nasal discharge (watery, pale turbinates b/l) present.   Mouth/Throat: Mucous membranes are moist. No tonsillar exudate. Oropharynx is clear. Pharynx is normal.   Eyes: Conjunctivae are normal.   + allergic shiners   Neck: Neck supple.   Cardiovascular: Normal rate and regular rhythm.  Pulses are palpable.    Pulmonary/Chest: Effort normal and breath sounds normal. She has no wheezes. She has no rhonchi. She has no rales.   Lymphadenopathy:     She has no cervical adenopathy.   Neurological: She is  alert.   Skin: Skin is warm.   Vitals reviewed.      ASSESSMENT:  PLAN:  Magdalena was seen today for cough and sore throat.    Diagnoses and all orders for this visit:    Acute seasonal allergic rhinitis, unspecified trigger  -     loratadine (CLARITIN) 5 mg/5 mL syrup; Take 5 mLs (5 mg total) by mouth once daily.    Acute pharyngitis, unspecified etiology    No s/s of bacterial etiology on physical exam today. Discussed that symptoms are secondary to allergic rhinitis. Discussed if persistent throat pain, fevers, or any other concerns to notify clinic.    As always, drinking clear fluids helps hydrate and keep secretions thin.  Tylenol/Motrin as needed for any pain or fever.  Explained usual course for this illness, including how long symptoms may last.    If Lawrencelaith Avila isnt better after 3 days, call with update or schedule appointment.     Yes

## 2022-08-14 NOTE — ED PROVIDER NOTE - NS ED ROS FT
Gen: Denies fever.   HEENT: Denies headache. Denies congestion.  CV: Denies chest pain. Denies lightheadedness.  Skin: Denies rash.   Resp: Denies SOB. Denies cough.  GI: Denies abd pain. Denies nausea. Denies vomiting. +diarrhea. Denies melena. Denies hematochezia.  Msk: Denies extremity swelling. Denies extremity pain.  : Denies dysuria. Denies hematuria.  Neuro: Denies LOC. Denies dizziness. Denies new numbness/tingling. Denies new focal weakness.  Psych: Denies SI

## 2022-08-14 NOTE — ED PROVIDER NOTE - OBJECTIVE STATEMENT
27 yo M w/ chronic hx of intermittent diarrhea, now w/ few months of BRBPR and s/p findings of rectal/colon CA ~2 weeks ago, presenting to ED to be admitted for surgery. Pt otherwise asymptomatic, no abd pain, fevers, or N/V. Not undergoing chemo or radiation at this time.

## 2022-08-14 NOTE — ED PROVIDER NOTE - ATTENDING CONTRIBUTION TO CARE
GEN: no acute respiratory distress. nontoxic, speaking comfortably in full sentences,  HEENT: NCAT. face symmetrical. PERRL 4mm, EOMI,  MMM, oropharynx wnl.  Neck: no JVD, trachea midline, no LAD  CV: RRR. +S1S2, no murmur.   Chest: CTA B/l. no wheezing, rales, rhonchi. no retractions. good air movement.   ABD: +BS, soft, non distended, non tender.   MSK: No clubbing, cyanosis, edema. FROM of all extremities. no tenderness to palpation. No midline or paraspinal tenderness.   Neuro: AAOX3. Sensation intact, motor 5/5 throughout.   SKIN: No erythema, lesions or rash    27 yo m chronic diarrhea/blood in stool recent fx rectal mass out-patient. sent in by surgeon for further management. patient w/o significant complaints at this time. plan: labs, surgery consulted.

## 2022-08-14 NOTE — H&P ADULT - HISTORY OF PRESENT ILLNESS
Mr. Larkin is a 25 yo male with no significant PMHx/SHx who was recently diagnosed with rectal cancer on colonoscopy who presents for scheduled low anterior resection.     Patient reports minimal abdominal or rectal pain at this time. Denies fevers, chills, nausea, vomiting. Passing flatus/stool. Tolerating his diet.     In ED, patient is HDS. Labs unremarkable.

## 2022-08-14 NOTE — H&P ADULT - ASSESSMENT
Mr. Larkin is a 25 yo M recently diagnosed with rectal cancer who presents for scheduled low anterior resection.     Plan:  -Admit to Dr. Boris Stark, A team Surgery  -OR Tomorrow  -CLD  -NPO @ Midnight  -MoviPrep  -Neomycin/Flagyl  -IVF   -DVT ppx   -AM Pre Op Labs  -Maintain Active T/S    Discussed with Attending Surgeon Dr. Boris Seymour MD PGY 2   A Team Surgery   [id number]

## 2022-08-14 NOTE — ED ADULT NURSE NOTE - OBJECTIVE STATEMENT
A4, ambulatory male presents to the ED for presurgical testing for his colon CA. Patient denies pain. No acute distress.

## 2022-08-15 ENCOUNTER — RESULT REVIEW (OUTPATIENT)
Age: 27
End: 2022-08-15

## 2022-08-15 ENCOUNTER — TRANSCRIPTION ENCOUNTER (OUTPATIENT)
Age: 27
End: 2022-08-15

## 2022-08-15 DIAGNOSIS — Z01.818 ENCOUNTER FOR OTHER PREPROCEDURAL EXAMINATION: ICD-10-CM

## 2022-08-15 DIAGNOSIS — C20 MALIGNANT NEOPLASM OF RECTUM: ICD-10-CM

## 2022-08-15 LAB
ANION GAP SERPL CALC-SCNC: 12 MMOL/L — SIGNIFICANT CHANGE UP (ref 7–14)
ANION GAP SERPL CALC-SCNC: 9 MMOL/L — SIGNIFICANT CHANGE UP (ref 7–14)
APTT BLD: 31.2 SEC — SIGNIFICANT CHANGE UP (ref 27–36.3)
BLD GP AB SCN SERPL QL: NEGATIVE — SIGNIFICANT CHANGE UP
BUN SERPL-MCNC: 10 MG/DL — SIGNIFICANT CHANGE UP (ref 7–23)
BUN SERPL-MCNC: 9 MG/DL — SIGNIFICANT CHANGE UP (ref 7–23)
CALCIUM SERPL-MCNC: 8.7 MG/DL — SIGNIFICANT CHANGE UP (ref 8.4–10.5)
CALCIUM SERPL-MCNC: 9.2 MG/DL — SIGNIFICANT CHANGE UP (ref 8.4–10.5)
CHLORIDE SERPL-SCNC: 101 MMOL/L — SIGNIFICANT CHANGE UP (ref 98–107)
CHLORIDE SERPL-SCNC: 103 MMOL/L — SIGNIFICANT CHANGE UP (ref 98–107)
CO2 SERPL-SCNC: 23 MMOL/L — SIGNIFICANT CHANGE UP (ref 22–31)
CO2 SERPL-SCNC: 26 MMOL/L — SIGNIFICANT CHANGE UP (ref 22–31)
CREAT SERPL-MCNC: 0.79 MG/DL — SIGNIFICANT CHANGE UP (ref 0.5–1.3)
CREAT SERPL-MCNC: 0.94 MG/DL — SIGNIFICANT CHANGE UP (ref 0.5–1.3)
EGFR: 115 ML/MIN/1.73M2 — SIGNIFICANT CHANGE UP
EGFR: 126 ML/MIN/1.73M2 — SIGNIFICANT CHANGE UP
GLUCOSE BLDC GLUCOMTR-MCNC: 93 MG/DL — SIGNIFICANT CHANGE UP (ref 70–99)
GLUCOSE SERPL-MCNC: 100 MG/DL — HIGH (ref 70–99)
GLUCOSE SERPL-MCNC: 123 MG/DL — HIGH (ref 70–99)
HCT VFR BLD CALC: 40.4 % — SIGNIFICANT CHANGE UP (ref 39–50)
HCT VFR BLD CALC: 41.6 % — SIGNIFICANT CHANGE UP (ref 39–50)
HGB BLD-MCNC: 12.8 G/DL — LOW (ref 13–17)
HGB BLD-MCNC: 13.8 G/DL — SIGNIFICANT CHANGE UP (ref 13–17)
INR BLD: 1.15 RATIO — SIGNIFICANT CHANGE UP (ref 0.88–1.16)
MAGNESIUM SERPL-MCNC: 1.6 MG/DL — SIGNIFICANT CHANGE UP (ref 1.6–2.6)
MAGNESIUM SERPL-MCNC: 2 MG/DL — SIGNIFICANT CHANGE UP (ref 1.6–2.6)
MCHC RBC-ENTMCNC: 25.3 PG — LOW (ref 27–34)
MCHC RBC-ENTMCNC: 25.8 PG — LOW (ref 27–34)
MCHC RBC-ENTMCNC: 31.7 GM/DL — LOW (ref 32–36)
MCHC RBC-ENTMCNC: 33.2 GM/DL — SIGNIFICANT CHANGE UP (ref 32–36)
MCV RBC AUTO: 77.8 FL — LOW (ref 80–100)
MCV RBC AUTO: 79.8 FL — LOW (ref 80–100)
NRBC # BLD: 0 /100 WBCS — SIGNIFICANT CHANGE UP
NRBC # BLD: 0 /100 WBCS — SIGNIFICANT CHANGE UP
NRBC # FLD: 0 K/UL — SIGNIFICANT CHANGE UP
NRBC # FLD: 0 K/UL — SIGNIFICANT CHANGE UP
PHOSPHATE SERPL-MCNC: 2.7 MG/DL — SIGNIFICANT CHANGE UP (ref 2.5–4.5)
PHOSPHATE SERPL-MCNC: 3.1 MG/DL — SIGNIFICANT CHANGE UP (ref 2.5–4.5)
PLATELET # BLD AUTO: 319 K/UL — SIGNIFICANT CHANGE UP (ref 150–400)
PLATELET # BLD AUTO: 327 K/UL — SIGNIFICANT CHANGE UP (ref 150–400)
POTASSIUM SERPL-MCNC: 3.9 MMOL/L — SIGNIFICANT CHANGE UP (ref 3.5–5.3)
POTASSIUM SERPL-MCNC: 4.1 MMOL/L — SIGNIFICANT CHANGE UP (ref 3.5–5.3)
POTASSIUM SERPL-SCNC: 3.9 MMOL/L — SIGNIFICANT CHANGE UP (ref 3.5–5.3)
POTASSIUM SERPL-SCNC: 4.1 MMOL/L — SIGNIFICANT CHANGE UP (ref 3.5–5.3)
PROTHROM AB SERPL-ACNC: 13.4 SEC — SIGNIFICANT CHANGE UP (ref 10.5–13.4)
RBC # BLD: 5.06 M/UL — SIGNIFICANT CHANGE UP (ref 4.2–5.8)
RBC # BLD: 5.35 M/UL — SIGNIFICANT CHANGE UP (ref 4.2–5.8)
RBC # FLD: 13.2 % — SIGNIFICANT CHANGE UP (ref 10.3–14.5)
RBC # FLD: 13.3 % — SIGNIFICANT CHANGE UP (ref 10.3–14.5)
RH IG SCN BLD-IMP: POSITIVE — SIGNIFICANT CHANGE UP
SODIUM SERPL-SCNC: 136 MMOL/L — SIGNIFICANT CHANGE UP (ref 135–145)
SODIUM SERPL-SCNC: 138 MMOL/L — SIGNIFICANT CHANGE UP (ref 135–145)
WBC # BLD: 16.56 K/UL — HIGH (ref 3.8–10.5)
WBC # BLD: 8.32 K/UL — SIGNIFICANT CHANGE UP (ref 3.8–10.5)
WBC # FLD AUTO: 16.56 K/UL — HIGH (ref 3.8–10.5)
WBC # FLD AUTO: 8.32 K/UL — SIGNIFICANT CHANGE UP (ref 3.8–10.5)

## 2022-08-15 PROCEDURE — 88305 TISSUE EXAM BY PATHOLOGIST: CPT | Mod: 26

## 2022-08-15 PROCEDURE — 88309 TISSUE EXAM BY PATHOLOGIST: CPT | Mod: 26

## 2022-08-15 PROCEDURE — 88312 SPECIAL STAINS GROUP 1: CPT | Mod: 26

## 2022-08-15 DEVICE — STAPLER COVIDIEN TA 60 BLUE: Type: IMPLANTABLE DEVICE | Status: FUNCTIONAL

## 2022-08-15 DEVICE — STAPLER COVIDIEN ENDO GIA 80-3.8MM BLUE: Type: IMPLANTABLE DEVICE | Status: FUNCTIONAL

## 2022-08-15 DEVICE — STAPLER ECHELON CIRCULAR POWERED 29MM: Type: IMPLANTABLE DEVICE | Status: FUNCTIONAL

## 2022-08-15 DEVICE — STAPLER COVIDIEN TA 60 BLUE RELOAD: Type: IMPLANTABLE DEVICE | Status: FUNCTIONAL

## 2022-08-15 RX ORDER — ONDANSETRON 8 MG/1
4 TABLET, FILM COATED ORAL EVERY 6 HOURS
Refills: 0 | Status: DISCONTINUED | OUTPATIENT
Start: 2022-08-15 | End: 2022-08-19

## 2022-08-15 RX ORDER — HYDROMORPHONE HYDROCHLORIDE 2 MG/ML
250 INJECTION INTRAMUSCULAR; INTRAVENOUS; SUBCUTANEOUS
Refills: 0 | Status: DISCONTINUED | OUTPATIENT
Start: 2022-08-15 | End: 2022-08-16

## 2022-08-15 RX ORDER — METRONIDAZOLE 500 MG
500 TABLET ORAL EVERY 8 HOURS
Refills: 0 | Status: DISCONTINUED | OUTPATIENT
Start: 2022-08-15 | End: 2022-08-17

## 2022-08-15 RX ORDER — HYDROMORPHONE HYDROCHLORIDE 2 MG/ML
0.5 INJECTION INTRAMUSCULAR; INTRAVENOUS; SUBCUTANEOUS
Refills: 0 | Status: DISCONTINUED | OUTPATIENT
Start: 2022-08-15 | End: 2022-08-16

## 2022-08-15 RX ORDER — NALOXONE HYDROCHLORIDE 4 MG/.1ML
0.1 SPRAY NASAL
Refills: 0 | Status: DISCONTINUED | OUTPATIENT
Start: 2022-08-15 | End: 2022-08-20

## 2022-08-15 RX ORDER — ACETAMINOPHEN 500 MG
1000 TABLET ORAL EVERY 8 HOURS
Refills: 0 | Status: DISCONTINUED | OUTPATIENT
Start: 2022-08-15 | End: 2022-08-16

## 2022-08-15 RX ORDER — OXYCODONE AND ACETAMINOPHEN 5; 325 MG/1; MG/1
1 TABLET ORAL ONCE
Refills: 0 | Status: DISCONTINUED | OUTPATIENT
Start: 2022-08-15 | End: 2022-08-16

## 2022-08-15 RX ORDER — CIPROFLOXACIN LACTATE 400MG/40ML
400 VIAL (ML) INTRAVENOUS EVERY 12 HOURS
Refills: 0 | Status: DISCONTINUED | OUTPATIENT
Start: 2022-08-15 | End: 2022-08-17

## 2022-08-15 RX ORDER — MAGNESIUM SULFATE 500 MG/ML
2 VIAL (ML) INJECTION
Refills: 0 | Status: COMPLETED | OUTPATIENT
Start: 2022-08-15 | End: 2022-08-16

## 2022-08-15 RX ORDER — HEPARIN SODIUM 5000 [USP'U]/ML
5000 INJECTION INTRAVENOUS; SUBCUTANEOUS EVERY 8 HOURS
Refills: 0 | Status: DISCONTINUED | OUTPATIENT
Start: 2022-08-15 | End: 2022-08-17

## 2022-08-15 RX ADMIN — Medication 1000 MILLILITER(S): at 06:20

## 2022-08-15 RX ADMIN — SODIUM CHLORIDE 125 MILLILITER(S): 9 INJECTION, SOLUTION INTRAVENOUS at 09:39

## 2022-08-15 RX ADMIN — Medication 250 MILLIGRAM(S): at 06:20

## 2022-08-15 RX ADMIN — SODIUM CHLORIDE 100 MILLILITER(S): 9 INJECTION, SOLUTION INTRAVENOUS at 08:59

## 2022-08-15 RX ADMIN — Medication 100 MILLIGRAM(S): at 23:11

## 2022-08-15 RX ADMIN — SODIUM CHLORIDE 100 MILLILITER(S): 9 INJECTION, SOLUTION INTRAVENOUS at 00:02

## 2022-08-15 RX ADMIN — Medication 200 MILLIGRAM(S): at 22:07

## 2022-08-15 RX ADMIN — NEOMYCIN SULFATE 500 MILLIGRAM(S): 500 TABLET ORAL at 06:21

## 2022-08-15 RX ADMIN — HEPARIN SODIUM 5000 UNIT(S): 5000 INJECTION INTRAVENOUS; SUBCUTANEOUS at 23:26

## 2022-08-15 RX ADMIN — HYDROMORPHONE HYDROCHLORIDE 0.5 MILLIGRAM(S): 2 INJECTION INTRAMUSCULAR; INTRAVENOUS; SUBCUTANEOUS at 22:35

## 2022-08-15 RX ADMIN — SODIUM CHLORIDE 125 MILLILITER(S): 9 INJECTION, SOLUTION INTRAVENOUS at 21:00

## 2022-08-15 RX ADMIN — HYDROMORPHONE HYDROCHLORIDE 250 MILLILITER(S): 2 INJECTION INTRAMUSCULAR; INTRAVENOUS; SUBCUTANEOUS at 22:51

## 2022-08-15 RX ADMIN — HYDROMORPHONE HYDROCHLORIDE 0.5 MILLIGRAM(S): 2 INJECTION INTRAMUSCULAR; INTRAVENOUS; SUBCUTANEOUS at 22:13

## 2022-08-15 NOTE — PROGRESS NOTE ADULT - ASSESSMENT
Mr. Larkin is a 25 yo M recently diagnosed with rectal cancer who presents for scheduled low anterior resection.     Plan:  -Admit to Dr. Boris Stark, A team Surgery  -OR Tomorrow  -CLD  -NPO @ Midnight  -MoviPrep  -Neomycin/Flagyl  -IVF   -DVT ppx   -AM Pre Op Labs  -Maintain Active T/S    Discussed with Attending Surgeon Dr. Boris Seymour MD PGY 2   A Team Surgery   [id number] Mr. Larkin is a 25 yo M recently diagnosed with rectal cancer who presents for scheduled low anterior resection.     Plan:  -Added on for today 8/15 -LAR.  -Completed MoviPrep  -Neomycin/Flagyl x3 doses completed  -IVF   -DVT ppx     A Team Surgery   [id number]

## 2022-08-15 NOTE — BRIEF OPERATIVE NOTE - OPERATION/FINDINGS
Exploratory Laparotomy, Mobilization to Splenic flexure along the white line of Toldt, rectosigmoid mass identified, high ligation of MAGNO at base of aorta, RP lymph nodes noted and taken with mesentery, proximal portion of resection created with blue load Endo ARIANNA stapler, distal portion with TA-90 stapler, 5 cm margins confirmed. Anastomosis created with electronic EEA stapler, air leak test completed successfully, hemostasis achieved, ascia closed with maxon suture, skin closed with staples, 1 MAICOL in operative bed in pelvis in place in RLQ Exploratory Laparotomy, Mobilization to Splenic flexure along the white line of Toldt, rectosigmoid mass identified, high ligation of MAGNO at base of aorta, RP lymph nodes noted and taken with mesentery, proximal portion of resection created with blue load Endo ARIANNA stapler, distal portion with TA-90 stapler, 5 cm margins confirmed. Side to end anastomosis created with electronic EEA stapler, air leak test completed successfully, hemostasis achieved, ascia closed with maxon suture, skin closed with staples, 1 MAICOL in operative bed in pelvis in place in RLQ

## 2022-08-15 NOTE — PATIENT PROFILE ADULT - FALL HARM RISK - UNIVERSAL INTERVENTIONS
Bed in lowest position, wheels locked, appropriate side rails in place/Call bell, personal items and telephone in reach/Instruct patient to call for assistance before getting out of bed or chair/Non-slip footwear when patient is out of bed/[place] to call system/Physically safe environment - no spills, clutter or unnecessary equipment/Purposeful Proactive Rounding/Room/bathroom lighting operational, light cord in reach

## 2022-08-15 NOTE — PROGRESS NOTE ADULT - SUBJECTIVE AND OBJECTIVE BOX
GENERAL SURGERY PROGRESS NOTE    OVERNIGHT EVENTS:  NAEON.     10-point review of systems completed and negative except as noted above.      OBJECTIVE    MEDICATIONS  enoxaparin Injectable 40 milliGRAM(s) SubCutaneous every 24 hours  lactated ringers. 1000 milliLiter(s) IV Continuous <Continuous>      PHYSICAL EXAM  T(C): 36.4 (08-15-22 @ 06:05), Max: 36.7 (08-14-22 @ 21:21)  HR: 66 (08-15-22 @ 06:05) (66 - 82)  BP: 139/77 (08-15-22 @ 06:05) (119/79 - 142/83)  RR: 18 (08-15-22 @ 06:05) (14 - 19)  SpO2: 100% (08-15-22 @ 06:05) (98% - 100%)    08-14-22 @ 07:01  -  08-15-22 @ 07:00  --------------------------------------------------------  IN: 1110 mL / OUT: 0 mL / NET: 1110 mL        General: Appears well, NAD  Neuro: AAOx3  CHEST: Clear to auscultation bilaterally  CV: Regular rate and rhythm  Abdomen: soft, mild tenderness to palpation, nondistended, no rebound or guarding  Extremities: Grossly symmetric    LABS                        13.8   8.32  )-----------( 327      ( 15 Aug 2022 05:35 )             41.6     08-15    136  |  101  |  10  ----------------------------<  100<H>  3.9   |  23  |  0.79    Ca    9.2      15 Aug 2022 05:35  Phos  3.1     08-15  Mg     2.00     08-15    TPro  7.9  /  Alb  4.5  /  TBili  0.3  /  DBili  x   /  AST  17  /  ALT  21  /  AlkPhos  135<H>  08-14    PT/INR - ( 15 Aug 2022 05:35 )   PT: 13.4 sec;   INR: 1.15 ratio         PTT - ( 15 Aug 2022 05:35 )  PTT:31.2 sec      RADIOLOGY & ADDITIONAL STUDIES

## 2022-08-16 DIAGNOSIS — R11.0 NAUSEA: ICD-10-CM

## 2022-08-16 DIAGNOSIS — D72.829 ELEVATED WHITE BLOOD CELL COUNT, UNSPECIFIED: ICD-10-CM

## 2022-08-16 LAB
ANION GAP SERPL CALC-SCNC: 12 MMOL/L — SIGNIFICANT CHANGE UP (ref 7–14)
APTT BLD: 26.9 SEC — LOW (ref 27–36.3)
BASOPHILS # BLD AUTO: 0.01 K/UL — SIGNIFICANT CHANGE UP (ref 0–0.2)
BASOPHILS NFR BLD AUTO: 0.1 % — SIGNIFICANT CHANGE UP (ref 0–2)
BUN SERPL-MCNC: 8 MG/DL — SIGNIFICANT CHANGE UP (ref 7–23)
CALCIUM SERPL-MCNC: 8.6 MG/DL — SIGNIFICANT CHANGE UP (ref 8.4–10.5)
CHLORIDE SERPL-SCNC: 96 MMOL/L — LOW (ref 98–107)
CO2 SERPL-SCNC: 25 MMOL/L — SIGNIFICANT CHANGE UP (ref 22–31)
CREAT SERPL-MCNC: 0.79 MG/DL — SIGNIFICANT CHANGE UP (ref 0.5–1.3)
EGFR: 126 ML/MIN/1.73M2 — SIGNIFICANT CHANGE UP
EOSINOPHIL # BLD AUTO: 0 K/UL — SIGNIFICANT CHANGE UP (ref 0–0.5)
EOSINOPHIL NFR BLD AUTO: 0 % — SIGNIFICANT CHANGE UP (ref 0–6)
GLUCOSE SERPL-MCNC: 169 MG/DL — HIGH (ref 70–99)
HCT VFR BLD CALC: 40.5 % — SIGNIFICANT CHANGE UP (ref 39–50)
HGB BLD-MCNC: 13.1 G/DL — SIGNIFICANT CHANGE UP (ref 13–17)
IANC: 12.2 K/UL — HIGH (ref 1.8–7.4)
IMM GRANULOCYTES NFR BLD AUTO: 0.5 % — SIGNIFICANT CHANGE UP (ref 0–1.5)
INR BLD: 1.25 RATIO — HIGH (ref 0.88–1.16)
LYMPHOCYTES # BLD AUTO: 0.55 K/UL — LOW (ref 1–3.3)
LYMPHOCYTES # BLD AUTO: 4.1 % — LOW (ref 13–44)
MAGNESIUM SERPL-MCNC: 2.3 MG/DL — SIGNIFICANT CHANGE UP (ref 1.6–2.6)
MCHC RBC-ENTMCNC: 25.4 PG — LOW (ref 27–34)
MCHC RBC-ENTMCNC: 32.3 GM/DL — SIGNIFICANT CHANGE UP (ref 32–36)
MCV RBC AUTO: 78.6 FL — LOW (ref 80–100)
MONOCYTES # BLD AUTO: 0.46 K/UL — SIGNIFICANT CHANGE UP (ref 0–0.9)
MONOCYTES NFR BLD AUTO: 3.5 % — SIGNIFICANT CHANGE UP (ref 2–14)
NEUTROPHILS # BLD AUTO: 12.2 K/UL — HIGH (ref 1.8–7.4)
NEUTROPHILS NFR BLD AUTO: 91.8 % — HIGH (ref 43–77)
NRBC # BLD: 0 /100 WBCS — SIGNIFICANT CHANGE UP
NRBC # FLD: 0 K/UL — SIGNIFICANT CHANGE UP
PHOSPHATE SERPL-MCNC: 3.9 MG/DL — SIGNIFICANT CHANGE UP (ref 2.5–4.5)
PLATELET # BLD AUTO: 376 K/UL — SIGNIFICANT CHANGE UP (ref 150–400)
POTASSIUM SERPL-MCNC: 4.4 MMOL/L — SIGNIFICANT CHANGE UP (ref 3.5–5.3)
POTASSIUM SERPL-SCNC: 4.4 MMOL/L — SIGNIFICANT CHANGE UP (ref 3.5–5.3)
PROTHROM AB SERPL-ACNC: 14.5 SEC — HIGH (ref 10.5–13.4)
RBC # BLD: 5.15 M/UL — SIGNIFICANT CHANGE UP (ref 4.2–5.8)
RBC # FLD: 13.1 % — SIGNIFICANT CHANGE UP (ref 10.3–14.5)
SODIUM SERPL-SCNC: 133 MMOL/L — LOW (ref 135–145)
WBC # BLD: 13.29 K/UL — HIGH (ref 3.8–10.5)
WBC # FLD AUTO: 13.29 K/UL — HIGH (ref 3.8–10.5)

## 2022-08-16 RX ORDER — ACETAMINOPHEN 500 MG
1000 TABLET ORAL EVERY 6 HOURS
Refills: 0 | Status: DISCONTINUED | OUTPATIENT
Start: 2022-08-16 | End: 2022-08-19

## 2022-08-16 RX ORDER — HYDROMORPHONE HYDROCHLORIDE 2 MG/ML
250 INJECTION INTRAMUSCULAR; INTRAVENOUS; SUBCUTANEOUS
Refills: 0 | Status: DISCONTINUED | OUTPATIENT
Start: 2022-08-16 | End: 2022-08-17

## 2022-08-16 RX ORDER — PANTOPRAZOLE SODIUM 20 MG/1
40 TABLET, DELAYED RELEASE ORAL DAILY
Refills: 0 | Status: DISCONTINUED | OUTPATIENT
Start: 2022-08-16 | End: 2022-08-18

## 2022-08-16 RX ADMIN — ONDANSETRON 4 MILLIGRAM(S): 8 TABLET, FILM COATED ORAL at 05:40

## 2022-08-16 RX ADMIN — Medication 63.75 MILLIMOLE(S): at 07:44

## 2022-08-16 RX ADMIN — Medication 400 MILLIGRAM(S): at 00:51

## 2022-08-16 RX ADMIN — Medication 200 MILLIGRAM(S): at 17:54

## 2022-08-16 RX ADMIN — HYDROMORPHONE HYDROCHLORIDE 250 MILLILITER(S): 2 INJECTION INTRAMUSCULAR; INTRAVENOUS; SUBCUTANEOUS at 10:59

## 2022-08-16 RX ADMIN — Medication 400 MILLIGRAM(S): at 10:04

## 2022-08-16 RX ADMIN — Medication 1000 MILLIGRAM(S): at 21:45

## 2022-08-16 RX ADMIN — Medication 200 MILLIGRAM(S): at 05:10

## 2022-08-16 RX ADMIN — Medication 1000 MILLIGRAM(S): at 18:23

## 2022-08-16 RX ADMIN — ONDANSETRON 4 MILLIGRAM(S): 8 TABLET, FILM COATED ORAL at 13:25

## 2022-08-16 RX ADMIN — Medication 100 MILLIGRAM(S): at 22:13

## 2022-08-16 RX ADMIN — Medication 100 MILLIGRAM(S): at 17:54

## 2022-08-16 RX ADMIN — Medication 400 MILLIGRAM(S): at 21:15

## 2022-08-16 RX ADMIN — HYDROMORPHONE HYDROCHLORIDE 250 MILLILITER(S): 2 INJECTION INTRAMUSCULAR; INTRAVENOUS; SUBCUTANEOUS at 19:58

## 2022-08-16 RX ADMIN — Medication 25 GRAM(S): at 03:18

## 2022-08-16 RX ADMIN — Medication 1000 MILLIGRAM(S): at 10:34

## 2022-08-16 RX ADMIN — PANTOPRAZOLE SODIUM 40 MILLIGRAM(S): 20 TABLET, DELAYED RELEASE ORAL at 11:03

## 2022-08-16 RX ADMIN — Medication 100 MILLIGRAM(S): at 06:48

## 2022-08-16 RX ADMIN — Medication 400 MILLIGRAM(S): at 17:53

## 2022-08-16 RX ADMIN — HEPARIN SODIUM 5000 UNIT(S): 5000 INJECTION INTRAVENOUS; SUBCUTANEOUS at 17:54

## 2022-08-16 RX ADMIN — HEPARIN SODIUM 5000 UNIT(S): 5000 INJECTION INTRAVENOUS; SUBCUTANEOUS at 10:06

## 2022-08-16 RX ADMIN — HYDROMORPHONE HYDROCHLORIDE 250 MILLILITER(S): 2 INJECTION INTRAMUSCULAR; INTRAVENOUS; SUBCUTANEOUS at 08:40

## 2022-08-16 RX ADMIN — Medication 25 GRAM(S): at 00:52

## 2022-08-16 RX ADMIN — Medication 1000 MILLIGRAM(S): at 01:17

## 2022-08-16 RX ADMIN — HYDROMORPHONE HYDROCHLORIDE 250 MILLILITER(S): 2 INJECTION INTRAMUSCULAR; INTRAVENOUS; SUBCUTANEOUS at 00:29

## 2022-08-16 NOTE — PROGRESS NOTE ADULT - ASSESSMENT
Patient seen and examined with Dr. Soliz.    26 year old man s/p Open LAR for rectal cancer, POD#1, recovering well on the floors.      PLAN:  - Ice Chips, NPO/IVF  - c/w abx: cipro/flagyl  - c/w holden  - Activity: encourage Out of bed, IS  - Pain control PCA, standing IV Tylenol  - VTE prophylaxis with Heparin subcutaneous    A Team Surgery [id number]

## 2022-08-16 NOTE — CHART NOTE - NSCHARTNOTEFT_GEN_A_CORE
General Surgery Post op Check    Pt seen and examined without complaints. Pain is controlled. Denies SOB/CP/N/V.     Vital Signs Last 24 Hrs  T(C): 36.6 (16 Aug 2022 00:00), Max: 36.9 (15 Aug 2022 20:45)  T(F): 97.8 (16 Aug 2022 00:00), Max: 98.5 (15 Aug 2022 20:45)  HR: 80 (16 Aug 2022 00:00) (66 - 88)  BP: 104/60 (16 Aug 2022 00:00) (93/38 - 139/77)  BP(mean): 71 (16 Aug 2022 00:00) (48 - 76)  RR: 16 (16 Aug 2022 00:00) (10 - 21)  SpO2: 100% (16 Aug 2022 00:00) (90% - 100%)    Parameters below as of 16 Aug 2022 00:00  Patient On (Oxygen Delivery Method): room air        I&O's Summary    14 Aug 2022 07:01  -  15 Aug 2022 07:00  --------------------------------------------------------  IN: 1110 mL / OUT: 0 mL / NET: 1110 mL    15 Aug 2022 07:01  -  16 Aug 2022 00:31  --------------------------------------------------------  IN: 1175 mL / OUT: 1010 mL / NET: 165 mL        Physical Exam  Gen: NAD, A&Ox3  Pulm: No respiratory distress, no subcostal retractions  CV: RRR, no JVD  Abd: Soft, NT, ND, midline incision w/overlying aquacel dry  Drains: MAICOL in RLQ with ss output  Extremities:  FROM, warm and well perfused, equal bilateral muscle strength  : Braswell in place    A/P: 26y Male s/p exploratory laparotomy and LAR for rectosigmoid mass  -DVT prophylaxis w/ SQH.   -Strict I&O's  -Monitor MAICOL drain output  -Analgesia and antiemetics as needed - PCEA and standing Tylenol  -Repleting electrolytes from PACU labs  -NPO/IVF  -Dispo: floor    Rory De La Fuente, PGY-1

## 2022-08-16 NOTE — PROGRESS NOTE ADULT - ASSESSMENT
Mr. Larkin is a 25 yo M recently diagnosed with rectal cancer is s/p scheduled low anterior resection on 8/15.     Plan:  -DVT ppx: SQH  -PCEA pump, stnading tylenol  -Cipro/flagyl  -Strict I's&O's  -NPO/IVF   -DVT ppx     A Team Surgery   [id number] Mr. Larkin is a 27 yo M recently diagnosed with rectal cancer is s/p scheduled low anterior resection on 8/15.     Plan:  -Pain: standing tylenol/PCEA  -Abx: Cipro/flagyl  -DVT ppx: SQH  -Strict I's&O's  -NPO/IVF   -DVT ppx     A Team Surgery   [id number]

## 2022-08-16 NOTE — PROGRESS NOTE ADULT - SUBJECTIVE AND OBJECTIVE BOX
Overnight events:  - Post op checked, stable   Surgery Progress Note    SUBJECTIVE:   Overnight events:  - Post op checked, stable    Pt seen and examined at bedside. Patient comfortable and in no-apparent distress. Reports nausea without vomiting. Pain is controlled. -Gas/-BM. Currently NPO.    OBJECTIVE:  Vital Signs Last 24 Hrs  T(C): 37 (16 Aug 2022 06:41), Max: 37 (16 Aug 2022 06:41)  T(F): 98.6 (16 Aug 2022 06:41), Max: 98.6 (16 Aug 2022 06:41)  HR: 77 (16 Aug 2022 06:41) (66 - 88)  BP: 118/60 (16 Aug 2022 06:41) (93/38 - 129/73)  BP(mean): 71 (16 Aug 2022 00:00) (48 - 76)  RR: 18 (16 Aug 2022 06:41) (10 - 21)  SpO2: 100% (16 Aug 2022 06:41) (90% - 100%)    Parameters below as of 16 Aug 2022 06:41  Patient On (Oxygen Delivery Method): room air    Physical Exam:  General Appearance: Appears well, NAD  Respiratory: No labored breathing  CV: Pulse regularly present  Abdomen: Soft, nontender, mildly distended, midline incision covered with dressing.      LABS:                        12.8   16.56 )-----------( 319      ( 15 Aug 2022 21:00 )             40.4     08-15    138  |  103  |  9   ----------------------------<  123<H>  4.1   |  26  |  0.94    Ca    8.7      15 Aug 2022 21:00  Phos  2.7     08-15  Mg     1.60     08-15    TPro  7.9  /  Alb  4.5  /  TBili  0.3  /  DBili  x   /  AST  17  /  ALT  21  /  AlkPhos  135<H>  08-14    PT/INR - ( 15 Aug 2022 05:35 )   PT: 13.4 sec;   INR: 1.15 ratio    PTT - ( 15 Aug 2022 05:35 )  PTT:31.2 sec      RADIOLOGY & ADDITIONAL STUDIES:      INs and OUTs:    08-15-22 @ 07:01  -  08-16-22 @ 07:00  --------------------------------------------------------  IN: 1300 mL / OUT: 1445 mL / NET: -145 mL        MEDICATIONS  (STANDING):  acetaminophen   IVPB .. 1000 milliGRAM(s) IV Intermittent every 8 hours  ciprofloxacin   IVPB 400 milliGRAM(s) IV Intermittent every 12 hours  heparin   Injectable 5000 Unit(s) SubCutaneous every 8 hours  hydromorphone (10 MICROgram(s)/mL) + bupivacaine 0.0625% in 0.9% Sodium Chloride PCEA 250 milliLiter(s) Epidural PCA Continuous  lactated ringers. 1000 milliLiter(s) (125 mL/Hr) IV Continuous <Continuous>  metroNIDAZOLE  IVPB 500 milliGRAM(s) IV Intermittent every 8 hours  sodium phosphate IVPB 15 milliMole(s) IV Intermittent once    MEDICATIONS  (PRN):  naloxone Injectable 0.1 milliGRAM(s) IV Push every 3 minutes PRN For ANY of the following changes in patient status:  A. RR LESS THAN 10 breaths per minute, B. Oxygen saturation LESS THAN 90%, C. Sedation score of 6  ondansetron Injectable 4 milliGRAM(s) IV Push every 6 hours PRN Nausea

## 2022-08-16 NOTE — PROGRESS NOTE ADULT - SUBJECTIVE AND OBJECTIVE BOX
Patient is a 26y old  Male who presents with a chief complaint of scheduled low anterior resection (16 Aug 2022 01:15)      DATE OF SERVICE: 08-16-22 @ 10:33    SUBJECTIVE / OVERNIGHT EVENTS: overnight events noted  + post outpatient pain   + nausea    ROS:  Resp: No cough no sputum production  CVS: No chest pain no palpitations no orthopnea  GI: see above  : no dysuria, no hematuria  Neuro: no weakness no paresthesias  Heme: No petechiae no easy bruising  Msk: No joint pain no swelling  Skin: No rash no itching        MEDICATIONS  (STANDING):  acetaminophen   IVPB .. 1000 milliGRAM(s) IV Intermittent every 6 hours  ciprofloxacin   IVPB 400 milliGRAM(s) IV Intermittent every 12 hours  heparin   Injectable 5000 Unit(s) SubCutaneous every 8 hours  hydromorphone (10 MICROgram(s)/mL) + bupivacaine 0.0625% in 0.9% Sodium Chloride PCEA 250 milliLiter(s) Epidural PCA Continuous  lactated ringers. 1000 milliLiter(s) (125 mL/Hr) IV Continuous <Continuous>  metroNIDAZOLE  IVPB 500 milliGRAM(s) IV Intermittent every 8 hours  pantoprazole  Injectable 40 milliGRAM(s) IV Push daily    MEDICATIONS  (PRN):  hydromorphone (10 MICROgram(s)/mL) + bupivacaine 0.0625% in 0.9% Sodium Chloride PCEA Rescue Clinician  Bolus 5 milliLiter(s) Epidural every 15 minutes PRN for Pain Score greater than 6  naloxone Injectable 0.1 milliGRAM(s) IV Push every 3 minutes PRN For ANY of the following changes in patient status:  A. RR LESS THAN 10 breaths per minute, B. Oxygen saturation LESS THAN 90%, C. Sedation score of 6  ondansetron Injectable 4 milliGRAM(s) IV Push every 6 hours PRN Nausea        CAPILLARY BLOOD GLUCOSE      POCT Blood Glucose.: 93 mg/dL (15 Aug 2022 15:06)    I&O's Summary    15 Aug 2022 07:01  -  16 Aug 2022 07:00  --------------------------------------------------------  IN: 1300 mL / OUT: 2165 mL / NET: -865 mL        Vital Signs Last 24 Hrs  T(C): 37 (16 Aug 2022 06:41), Max: 37 (16 Aug 2022 06:41)  T(F): 98.6 (16 Aug 2022 06:41), Max: 98.6 (16 Aug 2022 06:41)  HR: 77 (16 Aug 2022 06:41) (73 - 88)  BP: 118/60 (16 Aug 2022 06:41) (93/38 - 129/73)  BP(mean): 71 (16 Aug 2022 00:00) (48 - 76)  RR: 18 (16 Aug 2022 06:41) (10 - 21)  SpO2: 100% (16 Aug 2022 06:41) (90% - 100%)    PHYSICAL EXAM:  GENERAL: in no apparent distress  HEAD:  Atraumatic, Normocephalic  EYES: EOMI, PERRLA, sclera clear  NECK: Supple, No JVD  CHEST/LUNG: clear b/l, no wheeze  HEART: S1 S2; no murmurs appreciated  ABDOMEN: Soft, appropriate tenderness   EXTREMITIES:  No clubbing or cyanosis,  no edema  NEUROLOGY: AO x 3 non-focal  SKIN: No rashes or lesions    LABS:                        13.1   13.29 )-----------( 376      ( 16 Aug 2022 07:35 )             40.5     08-16    133<L>  |  96<L>  |  8   ----------------------------<  169<H>  4.4   |  25  |  0.79    Ca    8.6      16 Aug 2022 07:35  Phos  3.9     08-16  Mg     2.30     08-16      PT/INR - ( 16 Aug 2022 07:35 )   PT: 14.5 sec;   INR: 1.25 ratio         PTT - ( 16 Aug 2022 07:35 )  PTT:26.9 sec            All consultant(s) notes reviewed and care discussed with other providers        Contact Number, Dr White [phone]

## 2022-08-16 NOTE — PROGRESS NOTE ADULT - SUBJECTIVE AND OBJECTIVE BOX
Anesthesia Pain Management Service: Post Op Day 1__ of Epidural    SUBJECTIVE: Patient states his pain is managed with the PCEA. Patient states he had an episode of nausea, dizziness few minutes ago and now he feels better. Patient states he has some pain near the right lower quadrant.  Pain Scale Score:   Refer to charted pain scores    THERAPY:  [x ] Epidural Bupivacaine 0.0625% and Hydromorphone  		[ X] 10 micrograms/mL	[ ] 5 micrograms/mL  [ ] Epidural Bupivacaine 0.0625% and Fentanyl - 2 micrograms/mL  [ ] Epidural Ropivacaine 0.1% plain – 1 mg/mL  [ ] Patient Controlled Regional Anesthesia (PCRA) Ropivacaine  		[ ] 0.2%			[ ] 0.1%    Demand dose __3_ lockout __15_ (minutes) Continuous Rate __4_ Total: 76.35____ ml used (in past 24 hours)      MEDICATIONS  (STANDING):  acetaminophen   IVPB .. 1000 milliGRAM(s) IV Intermittent every 8 hours  ciprofloxacin   IVPB 400 milliGRAM(s) IV Intermittent every 12 hours  heparin   Injectable 5000 Unit(s) SubCutaneous every 8 hours  hydromorphone (10 MICROgram(s)/mL) + bupivacaine 0.0625% in 0.9% Sodium Chloride PCEA 250 milliLiter(s) Epidural PCA Continuous  lactated ringers. 1000 milliLiter(s) (125 mL/Hr) IV Continuous <Continuous>  metroNIDAZOLE  IVPB 500 milliGRAM(s) IV Intermittent every 8 hours    MEDICATIONS  (PRN):  hydromorphone (10 MICROgram(s)/mL) + bupivacaine 0.0625% in 0.9% Sodium Chloride PCEA Rescue Clinician  Bolus 5 milliLiter(s) Epidural every 15 minutes PRN for Pain Score greater than 6  naloxone Injectable 0.1 milliGRAM(s) IV Push every 3 minutes PRN For ANY of the following changes in patient status:  A. RR LESS THAN 10 breaths per minute, B. Oxygen saturation LESS THAN 90%, C. Sedation score of 6  ondansetron Injectable 4 milliGRAM(s) IV Push every 6 hours PRN Nausea      OBJECTIVE: Patient laying in bed.    Assessment of Catheter Site:	[ ] Left	[ ] Right  [x ] Epidural 	[ ] Femoral	      [ ] Saphenous   [ ] Supraclavicular   [ ] Other:    [x ] Dressing intact	[x ] Site non-tender	[ x] Site without erythema, discharge, edema  [x ] Epidural tubing and connection checked	[x] Gross neurological exam within normal limits  [ ] Catheter removed – tip intact		[ ] Afebrile  	[ ] Febrile: ___   [ X] see Temp under VS below)    PT/INR - ( 16 Aug 2022 07:35 )   PT: 14.5 sec;   INR: 1.25 ratio         PTT - ( 16 Aug 2022 07:35 )  PTT:26.9 sec                      13.1   13.29 )-----------( 376      ( 16 Aug 2022 07:35 )             40.5     Vital Signs Last 24 Hrs  T(C): 37 (08-16-22 @ 06:41), Max: 37 (08-16-22 @ 06:41)  T(F): 98.6 (08-16-22 @ 06:41), Max: 98.6 (08-16-22 @ 06:41)  HR: 77 (08-16-22 @ 06:41) (66 - 88)  BP: 118/60 (08-16-22 @ 06:41) (93/38 - 129/73)  BP(mean): 71 (08-16-22 @ 00:00) (48 - 76)  RR: 18 (08-16-22 @ 06:41) (10 - 21)  SpO2: 100% (08-16-22 @ 06:41) (90% - 100%)      Sedation Score:	[x ] Alert	[ ] Drowsy	[ ] Arousable	[ ] Asleep	[ ] Unresponsive    Side Effects:	[x ] None	[ ] Nausea	[ ] Vomiting	[ ] Pruritus  		[ ] Weakness		[ ] Numbness	[ ] Other:    ASSESSMENT/ PLAN:    Therapy to  be:	[x ] Continue   [ ] Discontinued   [ ] Change to prn Analgesics    Documentation and Verification of current medications:  [ X ] Done	[ ] Not done, not eligible, reason:    Comments: Continue PCEA. Increased continuous rate to 8ml/hr for better coverage. Doing OK with epidural and may continue.    Progress Note written now but Patient was seen earlier.

## 2022-08-16 NOTE — PROGRESS NOTE ADULT - SUBJECTIVE AND OBJECTIVE BOX
Anesthesia Pain Management Service- Attending Addendum    SUBJECTIVE: Pt doing well with PCEA without problems reported.    Therapy:	  [ ] IV PCA	   [ X] Epidural           [ ] s/p Spinal Opoid              [ ] Postpartum infusion	  [ ] Patient controlled regional anesthesia (PCRA)    [ ] prn Analgesics    Allergies    No Known Allergies    Intolerances      MEDICATIONS  (STANDING):  acetaminophen   IVPB .. 1000 milliGRAM(s) IV Intermittent every 8 hours  ciprofloxacin   IVPB 400 milliGRAM(s) IV Intermittent every 12 hours  heparin   Injectable 5000 Unit(s) SubCutaneous every 8 hours  hydromorphone (10 MICROgram(s)/mL) + bupivacaine 0.0625% in 0.9% Sodium Chloride PCEA 250 milliLiter(s) Epidural PCA Continuous  lactated ringers. 1000 milliLiter(s) (125 mL/Hr) IV Continuous <Continuous>  metroNIDAZOLE  IVPB 500 milliGRAM(s) IV Intermittent every 8 hours    MEDICATIONS  (PRN):  hydromorphone (10 MICROgram(s)/mL) + bupivacaine 0.0625% in 0.9% Sodium Chloride PCEA Rescue Clinician  Bolus 5 milliLiter(s) Epidural every 15 minutes PRN for Pain Score greater than 6  naloxone Injectable 0.1 milliGRAM(s) IV Push every 3 minutes PRN For ANY of the following changes in patient status:  A. RR LESS THAN 10 breaths per minute, B. Oxygen saturation LESS THAN 90%, C. Sedation score of 6  ondansetron Injectable 4 milliGRAM(s) IV Push every 6 hours PRN Nausea      OBJECTIVE:   [X] No new signs     [ ] Other:    Side Effects:  [X ] None			[ ] Other:    Assessment of Catheter Site:		[ X] Intact		[ ] Other:    ASSESSMENT/PLAN  [ X] Continue current therapy. We will increase the rate and bolus the epidural infusion. Appears to be getting a T10 dermatomal coverage and work to increase to cover more of the incisional pain.     [ ] Therapy changed to:    [ ] IV PCA       [ ] Epidural     [ ] prn Analgesics     Comments: Continue current PCEA settings.    Note written after patient seen

## 2022-08-16 NOTE — PROGRESS NOTE ADULT - SUBJECTIVE AND OBJECTIVE BOX
DATE OF SERVICE: 08-16-22 @ 09:04    INTERVAL HPI/OVERNIGHT EVENTS: Patient seen and examined, resting comfortably at bedside awake and alert. Patient c/o nausea without vomiting overnight, was given Zofran with some relief.  Denies passing flatus or having bowel movements. Patient voiding via holden cath without issues and has been out of bed and ambulating. Denies fever, chills, SOB, or chest pain. Pain well controlled.     STATUS POST:  Low anterior resection for rectal cancer    POST OPERATIVE DAY #: 1    MEDICATIONS  (STANDING):  acetaminophen   IVPB .. 1000 milliGRAM(s) IV Intermittent every 8 hours  ciprofloxacin   IVPB 400 milliGRAM(s) IV Intermittent every 12 hours  heparin   Injectable 5000 Unit(s) SubCutaneous every 8 hours  hydromorphone (10 MICROgram(s)/mL) + bupivacaine 0.0625% in 0.9% Sodium Chloride PCEA 250 milliLiter(s) Epidural PCA Continuous  lactated ringers. 1000 milliLiter(s) (125 mL/Hr) IV Continuous <Continuous>  metroNIDAZOLE  IVPB 500 milliGRAM(s) IV Intermittent every 8 hours    MEDICATIONS  (PRN):  naloxone Injectable 0.1 milliGRAM(s) IV Push every 3 minutes PRN For ANY of the following changes in patient status:  A. RR LESS THAN 10 breaths per minute, B. Oxygen saturation LESS THAN 90%, C. Sedation score of 6  ondansetron Injectable 4 milliGRAM(s) IV Push every 6 hours PRN Nausea      Vital Signs Last 24 Hrs  T(C): 37 (16 Aug 2022 06:41), Max: 37 (16 Aug 2022 06:41)  T(F): 98.6 (16 Aug 2022 06:41), Max: 98.6 (16 Aug 2022 06:41)  HR: 77 (16 Aug 2022 06:41) (66 - 88)  BP: 118/60 (16 Aug 2022 06:41) (93/38 - 129/73)  BP(mean): 71 (16 Aug 2022 00:00) (48 - 76)  RR: 18 (16 Aug 2022 06:41) (10 - 21)  SpO2: 100% (16 Aug 2022 06:41) (90% - 100%)    Parameters below as of 16 Aug 2022 06:41  Patient On (Oxygen Delivery Method): room air      I&O's Detail    15 Aug 2022 07:01  -  16 Aug 2022 07:00  --------------------------------------------------------  IN:    IV PiggyBack: 300 mL    Lactated Ringers: 1000 mL  Total IN: 1300 mL    OUT:    Bulb (mL): 105 mL    Indwelling Catheter - Urethral (mL): 1560 mL    Voided (mL): 500 mL  Total OUT: 2165 mL    Total NET: -865 mL        Physical Exam:  General: WN/WD NAD  Respiratory: airway patent, respirations unlabored, no increased WoB  Neurology: A&Ox3, nonfocal, PAUL x 4  Abdominal: Soft, appropriately tender at midline incision, ND, no rebounding or guarding  Holden Cath in place and functioning well with clear straw colored urine output  MAICOL drain patent, in place with moderate amt of SSF output      LABS:                        13.1   13.29 )-----------( 376      ( 16 Aug 2022 07:35 )             40.5     08-16    133<L>  |  96<L>  |  8   ----------------------------<  169<H>  4.4   |  25  |  0.79    Ca    8.6      16 Aug 2022 07:35  Phos  3.9     08-16  Mg     2.30     08-16    TPro  7.9  /  Alb  4.5  /  TBili  0.3  /  DBili  x   /  AST  17  /  ALT  21  /  AlkPhos  135<H>  08-14    PT/INR - ( 16 Aug 2022 07:35 )   PT: 14.5 sec;   INR: 1.25 ratio         PTT - ( 16 Aug 2022 07:35 )  PTT:26.9 sec

## 2022-08-16 NOTE — PROGRESS NOTE ADULT - ASSESSMENT
Mr. Larkin is a 27 yo male with no significant PMHx/SHx who was recently diagnosed with rectal cancer on colonoscopy who presents for scheduled low anterior resection

## 2022-08-17 LAB
ANION GAP SERPL CALC-SCNC: 14 MMOL/L — SIGNIFICANT CHANGE UP (ref 7–14)
APTT BLD: 27.1 SEC — SIGNIFICANT CHANGE UP (ref 27–36.3)
BUN SERPL-MCNC: 7 MG/DL — SIGNIFICANT CHANGE UP (ref 7–23)
CALCIUM SERPL-MCNC: 8.8 MG/DL — SIGNIFICANT CHANGE UP (ref 8.4–10.5)
CHLORIDE SERPL-SCNC: 100 MMOL/L — SIGNIFICANT CHANGE UP (ref 98–107)
CO2 SERPL-SCNC: 25 MMOL/L — SIGNIFICANT CHANGE UP (ref 22–31)
CREAT SERPL-MCNC: 0.98 MG/DL — SIGNIFICANT CHANGE UP (ref 0.5–1.3)
EGFR: 109 ML/MIN/1.73M2 — SIGNIFICANT CHANGE UP
GLUCOSE SERPL-MCNC: 100 MG/DL — HIGH (ref 70–99)
HCT VFR BLD CALC: 40 % — SIGNIFICANT CHANGE UP (ref 39–50)
HGB BLD-MCNC: 12.9 G/DL — LOW (ref 13–17)
INR BLD: 1.2 RATIO — HIGH (ref 0.88–1.16)
MAGNESIUM SERPL-MCNC: 2.1 MG/DL — SIGNIFICANT CHANGE UP (ref 1.6–2.6)
MCHC RBC-ENTMCNC: 26.2 PG — LOW (ref 27–34)
MCHC RBC-ENTMCNC: 32.3 GM/DL — SIGNIFICANT CHANGE UP (ref 32–36)
MCV RBC AUTO: 81.1 FL — SIGNIFICANT CHANGE UP (ref 80–100)
NRBC # BLD: 0 /100 WBCS — SIGNIFICANT CHANGE UP (ref 0–0)
NRBC # FLD: 0 K/UL — SIGNIFICANT CHANGE UP (ref 0–0)
PHOSPHATE SERPL-MCNC: 2.5 MG/DL — SIGNIFICANT CHANGE UP (ref 2.5–4.5)
PLATELET # BLD AUTO: 347 K/UL — SIGNIFICANT CHANGE UP (ref 150–400)
POTASSIUM SERPL-MCNC: 4.1 MMOL/L — SIGNIFICANT CHANGE UP (ref 3.5–5.3)
POTASSIUM SERPL-SCNC: 4.1 MMOL/L — SIGNIFICANT CHANGE UP (ref 3.5–5.3)
PROTHROM AB SERPL-ACNC: 13.9 SEC — HIGH (ref 10.5–13.4)
RBC # BLD: 4.93 M/UL — SIGNIFICANT CHANGE UP (ref 4.2–5.8)
RBC # FLD: 13.7 % — SIGNIFICANT CHANGE UP (ref 10.3–14.5)
SODIUM SERPL-SCNC: 139 MMOL/L — SIGNIFICANT CHANGE UP (ref 135–145)
WBC # BLD: 9.44 K/UL — SIGNIFICANT CHANGE UP (ref 3.8–10.5)
WBC # FLD AUTO: 9.44 K/UL — SIGNIFICANT CHANGE UP (ref 3.8–10.5)

## 2022-08-17 RX ORDER — HYDROMORPHONE HYDROCHLORIDE 2 MG/ML
0.3 INJECTION INTRAMUSCULAR; INTRAVENOUS; SUBCUTANEOUS
Refills: 0 | Status: DISCONTINUED | OUTPATIENT
Start: 2022-08-17 | End: 2022-08-17

## 2022-08-17 RX ORDER — HYDROMORPHONE HYDROCHLORIDE 2 MG/ML
1 INJECTION INTRAMUSCULAR; INTRAVENOUS; SUBCUTANEOUS
Refills: 0 | Status: DISCONTINUED | OUTPATIENT
Start: 2022-08-17 | End: 2022-08-18

## 2022-08-17 RX ORDER — HYDROMORPHONE HYDROCHLORIDE 2 MG/ML
0.5 INJECTION INTRAMUSCULAR; INTRAVENOUS; SUBCUTANEOUS
Refills: 0 | Status: DISCONTINUED | OUTPATIENT
Start: 2022-08-17 | End: 2022-08-17

## 2022-08-17 RX ORDER — HYDROMORPHONE HYDROCHLORIDE 2 MG/ML
0.5 INJECTION INTRAMUSCULAR; INTRAVENOUS; SUBCUTANEOUS
Refills: 0 | Status: DISCONTINUED | OUTPATIENT
Start: 2022-08-17 | End: 2022-08-18

## 2022-08-17 RX ORDER — ENOXAPARIN SODIUM 100 MG/ML
40 INJECTION SUBCUTANEOUS EVERY 24 HOURS
Refills: 0 | Status: DISCONTINUED | OUTPATIENT
Start: 2022-08-17 | End: 2022-08-20

## 2022-08-17 RX ADMIN — Medication 200 MILLIGRAM(S): at 05:22

## 2022-08-17 RX ADMIN — HYDROMORPHONE HYDROCHLORIDE 0.5 MILLIGRAM(S): 2 INJECTION INTRAMUSCULAR; INTRAVENOUS; SUBCUTANEOUS at 22:32

## 2022-08-17 RX ADMIN — HYDROMORPHONE HYDROCHLORIDE 0.5 MILLIGRAM(S): 2 INJECTION INTRAMUSCULAR; INTRAVENOUS; SUBCUTANEOUS at 19:07

## 2022-08-17 RX ADMIN — Medication 400 MILLIGRAM(S): at 17:29

## 2022-08-17 RX ADMIN — PANTOPRAZOLE SODIUM 40 MILLIGRAM(S): 20 TABLET, DELAYED RELEASE ORAL at 11:29

## 2022-08-17 RX ADMIN — Medication 100 MILLIGRAM(S): at 06:49

## 2022-08-17 RX ADMIN — HYDROMORPHONE HYDROCHLORIDE 250 MILLILITER(S): 2 INJECTION INTRAMUSCULAR; INTRAVENOUS; SUBCUTANEOUS at 00:57

## 2022-08-17 RX ADMIN — HYDROMORPHONE HYDROCHLORIDE 250 MILLILITER(S): 2 INJECTION INTRAMUSCULAR; INTRAVENOUS; SUBCUTANEOUS at 08:10

## 2022-08-17 RX ADMIN — HYDROMORPHONE HYDROCHLORIDE 0.5 MILLIGRAM(S): 2 INJECTION INTRAMUSCULAR; INTRAVENOUS; SUBCUTANEOUS at 19:37

## 2022-08-17 RX ADMIN — Medication 1000 MILLIGRAM(S): at 11:28

## 2022-08-17 RX ADMIN — Medication 1000 MILLIGRAM(S): at 04:45

## 2022-08-17 RX ADMIN — Medication 400 MILLIGRAM(S): at 10:58

## 2022-08-17 RX ADMIN — Medication 400 MILLIGRAM(S): at 04:15

## 2022-08-17 RX ADMIN — Medication 1000 MILLIGRAM(S): at 17:59

## 2022-08-17 RX ADMIN — Medication 63.75 MILLIMOLE(S): at 11:29

## 2022-08-17 RX ADMIN — HYDROMORPHONE HYDROCHLORIDE 0.5 MILLIGRAM(S): 2 INJECTION INTRAMUSCULAR; INTRAVENOUS; SUBCUTANEOUS at 23:02

## 2022-08-17 RX ADMIN — HEPARIN SODIUM 5000 UNIT(S): 5000 INJECTION INTRAVENOUS; SUBCUTANEOUS at 01:06

## 2022-08-17 RX ADMIN — HYDROMORPHONE HYDROCHLORIDE 0.5 MILLIGRAM(S): 2 INJECTION INTRAMUSCULAR; INTRAVENOUS; SUBCUTANEOUS at 16:21

## 2022-08-17 RX ADMIN — HYDROMORPHONE HYDROCHLORIDE 0.5 MILLIGRAM(S): 2 INJECTION INTRAMUSCULAR; INTRAVENOUS; SUBCUTANEOUS at 15:51

## 2022-08-17 NOTE — PROGRESS NOTE ADULT - SUBJECTIVE AND OBJECTIVE BOX
Patient is a 26y old  Male who presents with a chief complaint of scheduled low anterior resection (16 Aug 2022 01:15)      DATE OF SERVICE: 08-17-22 @ 10:28    SUBJECTIVE / OVERNIGHT EVENTS: overnight events noted    ROS:  Resp: No cough no sputum production  CVS: No chest pain no palpitations no orthopnea  GI: no N/V/D  : no dysuria, no hematuria          MEDICATIONS  (STANDING):  acetaminophen   IVPB .. 1000 milliGRAM(s) IV Intermittent every 6 hours  lactated ringers. 1000 milliLiter(s) (75 mL/Hr) IV Continuous <Continuous>  pantoprazole  Injectable 40 milliGRAM(s) IV Push daily  sodium phosphate IVPB 15 milliMole(s) IV Intermittent once    MEDICATIONS  (PRN):  HYDROmorphone  Injectable 0.5 milliGRAM(s) IV Push every 3 hours PRN Moderate Pain (4 - 6)  HYDROmorphone  Injectable 1 milliGRAM(s) IV Push every 3 hours PRN Severe Pain (7 - 10)  naloxone Injectable 0.1 milliGRAM(s) IV Push every 3 minutes PRN For ANY of the following changes in patient status:  A. RR LESS THAN 10 breaths per minute, B. Oxygen saturation LESS THAN 90%, C. Sedation score of 6  ondansetron Injectable 4 milliGRAM(s) IV Push every 6 hours PRN Nausea        CAPILLARY BLOOD GLUCOSE        I&O's Summary    16 Aug 2022 07:01  -  17 Aug 2022 07:00  --------------------------------------------------------  IN: 1800 mL / OUT: 4727.5 mL / NET: -2927.5 mL    17 Aug 2022 07:01  -  17 Aug 2022 10:28  --------------------------------------------------------  IN: 0 mL / OUT: 400 mL / NET: -400 mL        Vital Signs Last 24 Hrs  T(C): 36.7 (17 Aug 2022 10:13), Max: 36.7 (17 Aug 2022 06:01)  T(F): 98.1 (17 Aug 2022 10:13), Max: 98.1 (17 Aug 2022 10:13)  HR: 91 (17 Aug 2022 10:13) (69 - 91)  BP: 117/57 (17 Aug 2022 10:13) (106/59 - 129/65)  BP(mean): --  RR: 19 (17 Aug 2022 10:13) (17 - 19)  SpO2: 96% (17 Aug 2022 10:13) (95% - 100%)    PHYSICAL EXAM:  GENERAL: in no apparent distress  HEAD:  Atraumatic, Normocephalic  EYES: EOMI, PERRLA, sclera clear  NECK: Supple, No JVD  CHEST/LUNG: clear b/l, no wheeze  HEART: S1 S2; no murmurs appreciated  ABDOMEN: Soft, Nontender, Bowel sounds present  EXTREMITIES:  No clubbing or cyanosis,  no edema  NEUROLOGY: AO x 3 non-focal  SKIN: No rashes or lesions    LABS:                        12.9   9.44  )-----------( 347      ( 17 Aug 2022 07:05 )             40.0     08-17    139  |  100  |  7   ----------------------------<  100<H>  4.1   |  25  |  0.98    Ca    8.8      17 Aug 2022 07:05  Phos  2.5     08-17  Mg     2.10     08-17      PT/INR - ( 17 Aug 2022 07:05 )   PT: 13.9 sec;   INR: 1.20 ratio         PTT - ( 17 Aug 2022 07:05 )  PTT:27.1 sec            All consultant(s) notes reviewed and care discussed with other providers        Contact Number, Dr Whtie [phone]

## 2022-08-17 NOTE — PROGRESS NOTE ADULT - SUBJECTIVE AND OBJECTIVE BOX
Anesthesia Pain Management Service    SUBJECTIVE: Pt doing well with PCEA removed & no problems reported.    Therapy:	  [ ] IV PCA	   [ X] Epidural stopped          [ ] s/p Spinal Opoid              [ ] Postpartum infusion	  [ ] Patient controlled regional anesthesia (PCRA)    [ ] prn Analgesics    Allergies    No Known Allergies    Intolerances      MEDICATIONS  (STANDING):  acetaminophen   IVPB .. 1000 milliGRAM(s) IV Intermittent every 6 hours  lactated ringers. 1000 milliLiter(s) (75 mL/Hr) IV Continuous <Continuous>  pantoprazole  Injectable 40 milliGRAM(s) IV Push daily    MEDICATIONS  (PRN):  HYDROmorphone  Injectable 0.5 milliGRAM(s) IV Push every 3 hours PRN Moderate Pain (4 - 6)  HYDROmorphone  Injectable 1 milliGRAM(s) IV Push every 3 hours PRN Severe Pain (7 - 10)  naloxone Injectable 0.1 milliGRAM(s) IV Push every 3 minutes PRN For ANY of the following changes in patient status:  A. RR LESS THAN 10 breaths per minute, B. Oxygen saturation LESS THAN 90%, C. Sedation score of 6  ondansetron Injectable 4 milliGRAM(s) IV Push every 6 hours PRN Nausea      OBJECTIVE:   [X] No new signs     [ ] Other:    Side Effects:  [X ] None			[ ] Other:    Assessment of Catheter Site:		[ ] Intact		[ ] Other:    ASSESSMENT/PLAN  [ ] Continue current therapy    [X ] Therapy changed to:    [ ] IV PCA       [ ] Epidural     [ X] prn Analgesics     Comments: Epidural stopped & now to go on oral/IV opioids & adjuvant medication as needed.     Progress Note written now but Patient was seen earlier.

## 2022-08-17 NOTE — PROGRESS NOTE ADULT - SUBJECTIVE AND OBJECTIVE BOX
Surgery Progress Note    SUBJECTIVE:   Pt seen and examined at bedside. Patient comfortable and in no-apparent distress. Reports a huge reduction in his nausea with no more vomiting after having 3 episodes yesterday. Pain is controlled with PCEA, but reports itching around the insertion point. Has mild left thigh numbness but no loss of motor function. +Gas/-BM. Currently NPO.    OBJECTIVE:  Vital Signs Last 24 Hrs  T(C): 36.7 (17 Aug 2022 06:01), Max: 36.7 (17 Aug 2022 06:01)  T(F): 98 (17 Aug 2022 06:01), Max: 98 (17 Aug 2022 06:01)  HR: 79 (17 Aug 2022 06:01) (69 - 80)  BP: 123/79 (17 Aug 2022 06:01) (102/60 - 129/65)  BP(mean): --  RR: 17 (17 Aug 2022 06:01) (17 - 18)  SpO2: 95% (17 Aug 2022 06:01) (95% - 100%)    Parameters below as of 17 Aug 2022 06:01  Patient On (Oxygen Delivery Method): room air    Physical Exam:  General Appearance: Appears well, NAD  Respiratory: No labored breathing  CV: Pulse regularly present  Abdomen: Soft, Mildly/appropriately tender around midline incision, midline incision covered with Aquacel, MAICOL present and outputting moderate amount of serosanguinous fluid  : Braswell catheter present while PCEA is in effect    LABS:                        13.1   13.29 )-----------( 376      ( 16 Aug 2022 07:35 )             40.5     08-16    133<L>  |  96<L>  |  8   ----------------------------<  169<H>  4.4   |  25  |  0.79    Ca    8.6      16 Aug 2022 07:35  Phos  3.9     08-16  Mg     2.30     08-16      PT/INR - ( 16 Aug 2022 07:35 )   PT: 14.5 sec;   INR: 1.25 ratio    PTT - ( 16 Aug 2022 07:35 )  PTT:26.9 sec      RADIOLOGY & ADDITIONAL STUDIES:      INs and OUTs:    08-16-22 @ 07:01  -  08-17-22 @ 07:00  --------------------------------------------------------  IN: 1800 mL / OUT: 4727.5 mL / NET: -2927.5 mL        MEDICATIONS  (STANDING):  acetaminophen   IVPB .. 1000 milliGRAM(s) IV Intermittent every 6 hours  ciprofloxacin   IVPB 400 milliGRAM(s) IV Intermittent every 12 hours  heparin   Injectable 5000 Unit(s) SubCutaneous every 8 hours  hydromorphone (10 MICROgram(s)/mL) + bupivacaine 0.0625% in 0.9% Sodium Chloride PCEA 250 milliLiter(s) Epidural PCA Continuous  lactated ringers. 1000 milliLiter(s) (125 mL/Hr) IV Continuous <Continuous>  metroNIDAZOLE  IVPB 500 milliGRAM(s) IV Intermittent every 8 hours  pantoprazole  Injectable 40 milliGRAM(s) IV Push daily    MEDICATIONS  (PRN):  hydromorphone (10 MICROgram(s)/mL) + bupivacaine 0.0625% in 0.9% Sodium Chloride PCEA Rescue Clinician  Bolus 5 milliLiter(s) Epidural every 15 minutes PRN for Pain Score greater than 6  naloxone Injectable 0.1 milliGRAM(s) IV Push every 3 minutes PRN For ANY of the following changes in patient status:  A. RR LESS THAN 10 breaths per minute, B. Oxygen saturation LESS THAN 90%, C. Sedation score of 6  ondansetron Injectable 4 milliGRAM(s) IV Push every 6 hours PRN Nausea

## 2022-08-17 NOTE — PROGRESS NOTE ADULT - ASSESSMENT
26 year old man s/p Open LAR for rectal cancer, POD#2, recovering well on the floors.    PLAN:  - Abx: will D/C  - Diet: Clears  - Pain Control: IV Tylenol/Dilaudid PCEA. Discuss with Pain Management on plans for PCEA.   - Activity: encourage Out of bed, IS  - IVF: 75/hr LR  - VTE prophylaxis with Heparin subcutaneous    A Team Surgery [id number]

## 2022-08-17 NOTE — PROGRESS NOTE ADULT - SUBJECTIVE AND OBJECTIVE BOX
Anesthesia Pain Management Service: Post Op Day _2_ of Epidural    SUBJECTIVE: Patient reports he does not like epidural as he has been experiencing a lot of pruritus and does not want the epidural.  Pain Scale Score:   Refer to charted pain scores    THERAPY:  [x ] Epidural Bupivacaine 0.0625% and Hydromorphone  		[X ] 10 micrograms/mL	[ ] 5 micrograms/mL  [ ] Epidural Bupivacaine 0.0625% and Fentanyl - 2 micrograms/mL  [ ] Epidural Ropivacaine 0.1% plain – 1 mg/mL  [ ] Patient Controlled Regional Anesthesia (PCRA) Ropivacaine  		[ ] 0.2%			[ ] 0.1%    Demand dose __3_ lockout __15_ (minutes) Continuous Rate _8__ Total: _225.10__ Daily  Caruthers volume: 177.9    MEDICATIONS  (STANDING):  acetaminophen   IVPB .. 1000 milliGRAM(s) IV Intermittent every 6 hours  lactated ringers. 1000 milliLiter(s) (75 mL/Hr) IV Continuous <Continuous>  pantoprazole  Injectable 40 milliGRAM(s) IV Push daily  sodium phosphate IVPB 15 milliMole(s) IV Intermittent once    MEDICATIONS  (PRN):  HYDROmorphone  Injectable 0.5 milliGRAM(s) IV Push every 3 hours PRN Moderate-Severe Pain (4 - 10)  HYDROmorphone  Injectable 0.3 milliGRAM(s) IV Push every 3 hours PRN Severe Breakthrough Pain (7 - 10)  naloxone Injectable 0.1 milliGRAM(s) IV Push every 3 minutes PRN For ANY of the following changes in patient status:  A. RR LESS THAN 10 breaths per minute, B. Oxygen saturation LESS THAN 90%, C. Sedation score of 6  ondansetron Injectable 4 milliGRAM(s) IV Push every 6 hours PRN Nausea      OBJECTIVE: Patient sitting up in bed, mother at bedside.    Assessment of Catheter Site:	[ ] Left	[ ] Right  [x ] Epidural 	[ ] Femoral	      [ ] Saphenous   [ ] Supraclavicular   [ ] Other:    [x ] Dressing intact	[x ] Site non-tender	[ x] Site without erythema, discharge, edema  [x ] Epidural tubing and connection checked	[x] Gross neurological exam within normal limits  [X ] Catheter removed – tip intact		[ ] Afebrile	  [ ] Febrile: ___       [ X] see Temp under VS below)    PT/INR - ( 17 Aug 2022 07:05 )   PT: 13.9 sec;   INR: 1.20 ratio         PTT - ( 17 Aug 2022 07:05 )  PTT:27.1 sec                      12.9   9.44  )-----------( 347      ( 17 Aug 2022 07:05 )             40.0     Vital Signs Last 24 Hrs  T(C): 36.7 (08-17-22 @ 06:01), Max: 36.7 (08-17-22 @ 06:01)  T(F): 98 (08-17-22 @ 06:01), Max: 98 (08-17-22 @ 06:01)  HR: 79 (08-17-22 @ 06:01) (69 - 79)  BP: 123/79 (08-17-22 @ 06:01) (106/59 - 129/65)  BP(mean): --  RR: 17 (08-17-22 @ 06:01) (17 - 18)  SpO2: 95% (08-17-22 @ 06:01) (95% - 100%)      Sedation Score:	[x ] Alert	[ ] Drowsy	[ ] Arousable	[ ] Asleep	[ ] Unresponsive    Side Effects:	[x ] None	[ ] Nausea	[ ] Vomiting	[ ] Pruritus  		[ ] Weakness		[ ] Numbness	[ ] Other:    ASSESSMENT/ PLAN:    Therapy to  be:	[ ] Continue   [ X] Discontinued   [ X] Change to prn Analgesics    Documentation and Verification of current medications:  [ X ] Done	[ ] Not done, not eligible, reason:    Comments: Discussed patient with primary team, PCEA discontinued. Changed to IV/oral opioid and/or non-opioid Adjuvant analgesics to be used at this point.    Progress Note written now but Patient was seen earlier.

## 2022-08-17 NOTE — PROGRESS NOTE ADULT - SUBJECTIVE AND OBJECTIVE BOX
DATE OF SERVICE: 08-17-22 @ 11:03    INTERVAL HPI/OVERNIGHT EVENTS: Patient seen and examined, resting comfortably at bedside awake and alert. Patient c/o left thigh numbness without loss of motor function yesterday which has been resolving without intervention. Several episodes of vomiting yesterday. Today, patient reports tolerating CLD without nausea, vomiting. Admits to passing flatus but denies having bowel movements. Patient endorses voiding via holden cath without issues and has been out of bed. Denies fever, chills, SOB, or chest pain. Pain well controlled.    STATUS POST:  Low Anterior Resection for rectal cancer    POST OPERATIVE DAY #: 2    MEDICATIONS  (STANDING):  acetaminophen   IVPB .. 1000 milliGRAM(s) IV Intermittent every 6 hours  lactated ringers. 1000 milliLiter(s) (75 mL/Hr) IV Continuous <Continuous>  pantoprazole  Injectable 40 milliGRAM(s) IV Push daily  sodium phosphate IVPB 15 milliMole(s) IV Intermittent once    MEDICATIONS  (PRN):  HYDROmorphone  Injectable 0.5 milliGRAM(s) IV Push every 3 hours PRN Moderate Pain (4 - 6)  HYDROmorphone  Injectable 1 milliGRAM(s) IV Push every 3 hours PRN Severe Pain (7 - 10)  naloxone Injectable 0.1 milliGRAM(s) IV Push every 3 minutes PRN For ANY of the following changes in patient status:  A. RR LESS THAN 10 breaths per minute, B. Oxygen saturation LESS THAN 90%, C. Sedation score of 6  ondansetron Injectable 4 milliGRAM(s) IV Push every 6 hours PRN Nausea      Vital Signs Last 24 Hrs  T(C): 36.7 (17 Aug 2022 10:13), Max: 36.7 (17 Aug 2022 06:01)  T(F): 98.1 (17 Aug 2022 10:13), Max: 98.1 (17 Aug 2022 10:13)  HR: 91 (17 Aug 2022 10:13) (69 - 91)  BP: 117/57 (17 Aug 2022 10:13) (106/59 - 129/65)  BP(mean): --  RR: 19 (17 Aug 2022 10:13) (17 - 19)  SpO2: 96% (17 Aug 2022 10:13) (95% - 100%)    Parameters below as of 17 Aug 2022 10:13  Patient On (Oxygen Delivery Method): room air      I&O's Detail    16 Aug 2022 07:01  -  17 Aug 2022 07:00  --------------------------------------------------------  IN:    IV PiggyBack: 300 mL    Lactated Ringers: 1500 mL  Total IN: 1800 mL    OUT:    Bulb (mL): 102.5 mL    Indwelling Catheter - Urethral (mL): 4625 mL  Total OUT: 4727.5 mL    Total NET: -2927.5 mL      17 Aug 2022 07:01  -  17 Aug 2022 11:03  --------------------------------------------------------  IN:  Total IN: 0 mL    OUT:    Indwelling Catheter - Urethral (mL): 400 mL  Total OUT: 400 mL    Total NET: -400 mL    Physical Exam:  General: WN/WD NAD  Respiratory: airway patent, respirations unlabored, no increased WoB  Neurology: A&Ox3, nonfocal, PAUL x 4  Abdominal: Soft, appropriately tender to palpation, softly distended, no rebounding or guarding  Midline Incision C/D/I, no surrounding edema or erythema  MAICOL drain: patent and functioning well with SSF output  Holden: patent and functioning well with clear straw colored urine output       LABS:                        12.9   9.44  )-----------( 347      ( 17 Aug 2022 07:05 )             40.0     08-17    139  |  100  |  7   ----------------------------<  100<H>  4.1   |  25  |  0.98    Ca    8.8      17 Aug 2022 07:05  Phos  2.5     08-17  Mg     2.10     08-17      PT/INR - ( 17 Aug 2022 07:05 )   PT: 13.9 sec;   INR: 1.20 ratio         PTT - ( 17 Aug 2022 07:05 )  PTT:27.1 sec

## 2022-08-17 NOTE — PROGRESS NOTE ADULT - ASSESSMENT
Patient with his mother present, seen and examined with Dr. Stark    26 year old man s/p Open LAR for rectal cancer, POD#2, recovering well on the floors.    PLAN:  - poss d/c holden with TOV  - Diet: CLD  - Pain Control: IV Tylenol/Dilaudid PCEA  - Activity: encourage Out of bed, IS  - VTE prophylaxis with Heparin subcutaneous    A Team Surgery [id number]

## 2022-08-18 LAB
ANION GAP SERPL CALC-SCNC: 9 MMOL/L — SIGNIFICANT CHANGE UP (ref 7–14)
BUN SERPL-MCNC: 4 MG/DL — LOW (ref 7–23)
CALCIUM SERPL-MCNC: 9.2 MG/DL — SIGNIFICANT CHANGE UP (ref 8.4–10.5)
CHLORIDE SERPL-SCNC: 102 MMOL/L — SIGNIFICANT CHANGE UP (ref 98–107)
CO2 SERPL-SCNC: 29 MMOL/L — SIGNIFICANT CHANGE UP (ref 22–31)
CREAT SERPL-MCNC: 0.86 MG/DL — SIGNIFICANT CHANGE UP (ref 0.5–1.3)
EGFR: 122 ML/MIN/1.73M2 — SIGNIFICANT CHANGE UP
GLUCOSE SERPL-MCNC: 97 MG/DL — SIGNIFICANT CHANGE UP (ref 70–99)
HCT VFR BLD CALC: 41 % — SIGNIFICANT CHANGE UP (ref 39–50)
HGB BLD-MCNC: 12.7 G/DL — LOW (ref 13–17)
MAGNESIUM SERPL-MCNC: 1.9 MG/DL — SIGNIFICANT CHANGE UP (ref 1.6–2.6)
MCHC RBC-ENTMCNC: 25.1 PG — LOW (ref 27–34)
MCHC RBC-ENTMCNC: 31 GM/DL — LOW (ref 32–36)
MCV RBC AUTO: 81.2 FL — SIGNIFICANT CHANGE UP (ref 80–100)
NRBC # BLD: 0 /100 WBCS — SIGNIFICANT CHANGE UP (ref 0–0)
NRBC # FLD: 0 K/UL — SIGNIFICANT CHANGE UP (ref 0–0)
PHOSPHATE SERPL-MCNC: 3.3 MG/DL — SIGNIFICANT CHANGE UP (ref 2.5–4.5)
PLATELET # BLD AUTO: 352 K/UL — SIGNIFICANT CHANGE UP (ref 150–400)
POTASSIUM SERPL-MCNC: 4.3 MMOL/L — SIGNIFICANT CHANGE UP (ref 3.5–5.3)
POTASSIUM SERPL-SCNC: 4.3 MMOL/L — SIGNIFICANT CHANGE UP (ref 3.5–5.3)
RBC # BLD: 5.05 M/UL — SIGNIFICANT CHANGE UP (ref 4.2–5.8)
RBC # FLD: 13.6 % — SIGNIFICANT CHANGE UP (ref 10.3–14.5)
SODIUM SERPL-SCNC: 140 MMOL/L — SIGNIFICANT CHANGE UP (ref 135–145)
WBC # BLD: 9.57 K/UL — SIGNIFICANT CHANGE UP (ref 3.8–10.5)
WBC # FLD AUTO: 9.57 K/UL — SIGNIFICANT CHANGE UP (ref 3.8–10.5)

## 2022-08-18 RX ORDER — OXYCODONE HYDROCHLORIDE 5 MG/1
5 TABLET ORAL EVERY 6 HOURS
Refills: 0 | Status: DISCONTINUED | OUTPATIENT
Start: 2022-08-18 | End: 2022-08-20

## 2022-08-18 RX ORDER — OXYCODONE HYDROCHLORIDE 5 MG/1
10 TABLET ORAL EVERY 6 HOURS
Refills: 0 | Status: DISCONTINUED | OUTPATIENT
Start: 2022-08-18 | End: 2022-08-20

## 2022-08-18 RX ORDER — PANTOPRAZOLE SODIUM 20 MG/1
40 TABLET, DELAYED RELEASE ORAL
Refills: 0 | Status: DISCONTINUED | OUTPATIENT
Start: 2022-08-18 | End: 2022-08-20

## 2022-08-18 RX ADMIN — OXYCODONE HYDROCHLORIDE 10 MILLIGRAM(S): 5 TABLET ORAL at 19:40

## 2022-08-18 RX ADMIN — Medication 1000 MILLIGRAM(S): at 06:56

## 2022-08-18 RX ADMIN — ENOXAPARIN SODIUM 40 MILLIGRAM(S): 100 INJECTION SUBCUTANEOUS at 00:17

## 2022-08-18 RX ADMIN — Medication 400 MILLIGRAM(S): at 06:26

## 2022-08-18 RX ADMIN — HYDROMORPHONE HYDROCHLORIDE 0.5 MILLIGRAM(S): 2 INJECTION INTRAMUSCULAR; INTRAVENOUS; SUBCUTANEOUS at 11:22

## 2022-08-18 RX ADMIN — Medication 1000 MILLIGRAM(S): at 13:29

## 2022-08-18 RX ADMIN — HYDROMORPHONE HYDROCHLORIDE 0.5 MILLIGRAM(S): 2 INJECTION INTRAMUSCULAR; INTRAVENOUS; SUBCUTANEOUS at 04:16

## 2022-08-18 RX ADMIN — Medication 1000 MILLIGRAM(S): at 00:47

## 2022-08-18 RX ADMIN — Medication 1000 MILLIGRAM(S): at 19:16

## 2022-08-18 RX ADMIN — Medication 400 MILLIGRAM(S): at 12:59

## 2022-08-18 RX ADMIN — Medication 400 MILLIGRAM(S): at 00:17

## 2022-08-18 RX ADMIN — HYDROMORPHONE HYDROCHLORIDE 0.5 MILLIGRAM(S): 2 INJECTION INTRAMUSCULAR; INTRAVENOUS; SUBCUTANEOUS at 15:02

## 2022-08-18 RX ADMIN — HYDROMORPHONE HYDROCHLORIDE 0.5 MILLIGRAM(S): 2 INJECTION INTRAMUSCULAR; INTRAVENOUS; SUBCUTANEOUS at 03:46

## 2022-08-18 RX ADMIN — HYDROMORPHONE HYDROCHLORIDE 0.5 MILLIGRAM(S): 2 INJECTION INTRAMUSCULAR; INTRAVENOUS; SUBCUTANEOUS at 15:17

## 2022-08-18 RX ADMIN — Medication 400 MILLIGRAM(S): at 18:46

## 2022-08-18 RX ADMIN — HYDROMORPHONE HYDROCHLORIDE 0.5 MILLIGRAM(S): 2 INJECTION INTRAMUSCULAR; INTRAVENOUS; SUBCUTANEOUS at 11:52

## 2022-08-18 RX ADMIN — PANTOPRAZOLE SODIUM 40 MILLIGRAM(S): 20 TABLET, DELAYED RELEASE ORAL at 13:00

## 2022-08-18 NOTE — CONSULT NOTE ADULT - NS ATTEND AMEND GEN_ALL_CORE FT
25 y/o male, recently diagnosed with adenocarcinoma of the rectosigmoid colon, admitted for low anterior resection performed on August 15th. Will follow-up on pathology.     Continue post-surgical care per surgical oncology.  Will need outpatient PET/CT.

## 2022-08-18 NOTE — CONSULT NOTE ADULT - SUBJECTIVE AND OBJECTIVE BOX
Reason for consult: Rectal cancer    HPI:  Mr. Larkin is a 27 yo male with no significant PMHx/SHx who was recently diagnosed with rectal cancer on colonoscopy who presents for scheduled low anterior resection.     Patient reports minimal abdominal or rectal pain at this time. Denies fevers, chills, nausea, vomiting. Passing flatus/stool. Tolerating his diet.     Hematology/Oncology consulted for patient who is under care of Dr. Chuck Avalos of [facility] for the evaluation of a rectosigmoid mass with enlarged lymph nodes found on colonoscopy done 08/03. Last office visit was 08/04 when pathology was still pending. Per patient, final pathology showed rectal cancer, so he presents here for scheduled LAR. Patient had the surgery done 08/15, and reports feeling pretty well this morning. Reports minimal abdominal pain, otherwise no complaints.       PAST MEDICAL & SURGICAL HISTORY:  Acne      Ankle injury  &#x27; 2016  Right      Peritonsillar abscess      No significant past surgical history          FAMILY HISTORY:  No pertinent family history in first degree relatives        Alochol: Denied  Smoking: Nonsmoker  Drug Use: Denied  Marital Status:         Allergies    No Known Allergies    Intolerances        MEDICATIONS  (STANDING):  acetaminophen   IVPB .. 1000 milliGRAM(s) IV Intermittent every 6 hours  enoxaparin Injectable 40 milliGRAM(s) SubCutaneous every 24 hours  lactated ringers. 1000 milliLiter(s) (75 mL/Hr) IV Continuous <Continuous>  pantoprazole  Injectable 40 milliGRAM(s) IV Push daily    MEDICATIONS  (PRN):  HYDROmorphone  Injectable 0.5 milliGRAM(s) IV Push every 3 hours PRN Moderate Pain (4 - 6)  HYDROmorphone  Injectable 1 milliGRAM(s) IV Push every 3 hours PRN Severe Pain (7 - 10)  naloxone Injectable 0.1 milliGRAM(s) IV Push every 3 minutes PRN For ANY of the following changes in patient status:  A. RR LESS THAN 10 breaths per minute, B. Oxygen saturation LESS THAN 90%, C. Sedation score of 6  ondansetron Injectable 4 milliGRAM(s) IV Push every 6 hours PRN Nausea      ROS  No fever, sweats, chills  No epistaxis, HA, sore throat  No CP, SOB, cough, sputum  + mild abd pain  No edema  No rash  No anxiety  No back pain, joint pain  No bleeding, bruising  No dysuria, hematuria    T(C): 36.4 (08-18-22 @ 14:50), Max: 37 (08-17-22 @ 17:28)  HR: 84 (08-18-22 @ 14:50) (84 - 93)  BP: 115/77 (08-18-22 @ 14:50) (115/77 - 138/84)  RR: 18 (08-18-22 @ 14:50) (16 - 18)  SpO2: 100% (08-18-22 @ 14:50) (95% - 100%)  Wt(kg): --    PE  NAD  Awake, alert  Anicteric, MMM  RRR  CTAB  Abd soft, mildly distended  No c/c/e  No rash grossly                          12.7   9.57  )-----------( 352      ( 18 Aug 2022 07:23 )             41.0       08-18    140  |  102  |  4<L>  ----------------------------<  97  4.3   |  29  |  0.86    Ca    9.2      18 Aug 2022 07:23  Phos  3.3     08-18  Mg     1.90     08-18    
DATE OF SERVICE: 08-15-22 @ 13:58    Patient is a 26y old  Male who presents with a chief complaint of scheduled low anterior resection (15 Aug 2022 00:16)      Mr. Larkin is a 27 yo male with no significant PMHx/SHx who was recently diagnosed with rectal cancer on colonoscopy who presents for scheduled low anterior resection. Patient reports minimal abdominal or rectal pain at this time. Denies fevers, chills, nausea, vomiting. Passing flatus/stool. Tolerating his diet.       PAST MEDICAL & SURGICAL HISTORY:  Acne      Ankle injury  &#x27; 2016  Right      Peritonsillar abscess      No significant past surgical history          Review of Systems:   CONSTITUTIONAL: No fever, weight loss, or fatigue  EYES: No eye pain, visual disturbances, or discharge  ENMT:  No difficulty hearing, tinnitus, vertigo; No sinus or throat pain  NECK: No pain or stiffness  RESPIRATORY: No cough, wheezing, chills or hemoptysis; No shortness of breath  CARDIOVASCULAR: No chest pain, palpitations, dizziness, leg swelling or sob  GASTROINTESTINAL: No abdominal pain. No nausea, vomiting, diarrhea or constipation  GENITOURINARY: No dysuria, frequency, hematuria, or incontinence  NEUROLOGICAL: No headaches, memory loss, loss of strength, numbness, or tremors  SKIN: No itching, burning, rashes, or lesions   LYMPH NODES: No enlarged glands  ENDOCRINE: No heat or cold intolerance; No hair loss  MUSCULOSKELETAL: No joint pain or swelling; No muscle, back, or extremity pain  PSYCHIATRIC: No depression, anxiety, mood swings, or difficulty sleeping  HEME/LYMPH: No easy bruising, or bleeding gums  ALLERGY AND IMMUNOLOGIC: No hives or eczema    Allergies    No Known Allergies      Social History: non smoker  no IVDA  no ETOH abuse   lives with family     FAMILY HISTORY:  No pertinent family history in first degree relatives        MEDICATIONS  (STANDING):  enoxaparin Injectable 40 milliGRAM(s) SubCutaneous every 24 hours  lactated ringers. 1000 milliLiter(s) (125 mL/Hr) IV Continuous <Continuous>    MEDICATIONS  (PRN):      CAPILLARY BLOOD GLUCOSE        I&O's Summary    14 Aug 2022 07:01  -  15 Aug 2022 07:00  --------------------------------------------------------  IN: 1110 mL / OUT: 0 mL / NET: 1110 mL        24hrs Vital:  T(C): 36.3 (08-15-22 @ 10:00), Max: 36.7 (08-14-22 @ 21:21)  HR: 66 (08-15-22 @ 10:00) (66 - 80)  BP: 118/87 (08-15-22 @ 10:00) (118/87 - 139/77)  RR: 18 (08-15-22 @ 10:00) (14 - 19)  SpO2: 99% (08-15-22 @ 10:00) (98% - 100%)    PHYSICAL EXAM:  GENERAL: NAD, well-developed  HEAD:  Atraumatic, Normocephalic  EYES: EOMI, PERRLA, conjunctiva and sclera clear  NECK: Supple, No JVD  CHEST/LUNG: Clear to auscultation bilaterally; No wheeze  HEART: S1S2; No rubs, or gallops, no murmurs  ABDOMEN: Soft, Nontender; Bowel sounds present  EXTREMITIES:  + Peripheral Pulses, No clubbing or cyanosis, no edema  PSYCH: AO x 3,   NEUROLOGY: Alert, no focal motor or sensory deficits  SKIN: No rashes or lesions    LABS:                        13.8   8.32  )-----------( 327      ( 15 Aug 2022 05:35 )             41.6     08-15    136  |  101  |  10  ----------------------------<  100<H>  3.9   |  23  |  0.79    Ca    9.2      15 Aug 2022 05:35  Phos  3.1     08-15  Mg     2.00     08-15    TPro  7.9  /  Alb  4.5  /  TBili  0.3  /  DBili  x   /  AST  17  /  ALT  21  /  AlkPhos  135<H>  08-14    PT/INR - ( 15 Aug 2022 05:35 )   PT: 13.4 sec;   INR: 1.15 ratio         PTT - ( 15 Aug 2022 05:35 )  PTT:31.2 sec          RADIOLOGY & ADDITIONAL TESTS:    Consultant(s) Notes Reviewed:      Care Discussed with Consultants/Other Providers:

## 2022-08-18 NOTE — PROGRESS NOTE ADULT - SUBJECTIVE AND OBJECTIVE BOX
Patient is a 26y old  Male who presents with a chief complaint of Scheduled LAR (18 Aug 2022 04:12)      DATE OF SERVICE: 08-18-22 @ 09:43    SUBJECTIVE / OVERNIGHT EVENTS: overnight events noted  belly pain +    ROS:  Resp: No cough no sputum production  CVS: No chest pain no palpitations no orthopnea  GI: no N/V/D  : no dysuria, no hematuria  Neuro: no weakness no paresthesias          MEDICATIONS  (STANDING):  acetaminophen   IVPB .. 1000 milliGRAM(s) IV Intermittent every 6 hours  enoxaparin Injectable 40 milliGRAM(s) SubCutaneous every 24 hours  lactated ringers. 1000 milliLiter(s) (75 mL/Hr) IV Continuous <Continuous>  pantoprazole  Injectable 40 milliGRAM(s) IV Push daily    MEDICATIONS  (PRN):  HYDROmorphone  Injectable 0.5 milliGRAM(s) IV Push every 3 hours PRN Moderate Pain (4 - 6)  HYDROmorphone  Injectable 1 milliGRAM(s) IV Push every 3 hours PRN Severe Pain (7 - 10)  naloxone Injectable 0.1 milliGRAM(s) IV Push every 3 minutes PRN For ANY of the following changes in patient status:  A. RR LESS THAN 10 breaths per minute, B. Oxygen saturation LESS THAN 90%, C. Sedation score of 6  ondansetron Injectable 4 milliGRAM(s) IV Push every 6 hours PRN Nausea        CAPILLARY BLOOD GLUCOSE        I&O's Summary    17 Aug 2022 07:01  -  18 Aug 2022 07:00  --------------------------------------------------------  IN: 2640 mL / OUT: 4565 mL / NET: -1925 mL        Vital Signs Last 24 Hrs  T(C): 36.5 (18 Aug 2022 09:00), Max: 37 (17 Aug 2022 17:28)  T(F): 97.7 (18 Aug 2022 09:00), Max: 98.6 (17 Aug 2022 17:28)  HR: 92 (18 Aug 2022 09:00) (87 - 94)  BP: 119/79 (18 Aug 2022 09:00) (117/57 - 138/84)  BP(mean): --  RR: 18 (18 Aug 2022 09:00) (16 - 19)  SpO2: 98% (18 Aug 2022 09:00) (95% - 99%)    PHYSICAL EXAM:  GENERAL: in no apparent distress  HEAD:  Atraumatic, Normocephalic  EYES: EOMI, PERRLA, sclera clear  NECK: Supple, No JVD  CHEST/LUNG: clear   HEART: S1 S2; no murmurs   ABDOMEN: Soft, appropriate tenderness   EXTREMITIES:  no edema  NEUROLOGY: AO x 3 non-focal  SKIN: No rashes or lesions    LABS:                        12.7   9.57  )-----------( 352      ( 18 Aug 2022 07:23 )             41.0     08-18    140  |  102  |  4<L>  ----------------------------<  97  4.3   |  29  |  0.86    Ca    9.2      18 Aug 2022 07:23  Phos  3.3     08-18  Mg     1.90     08-18      PT/INR - ( 17 Aug 2022 07:05 )   PT: 13.9 sec;   INR: 1.20 ratio         PTT - ( 17 Aug 2022 07:05 )  PTT:27.1 sec            All consultant(s) notes reviewed and care discussed with other providers        Contact Number, Dr White [phone]

## 2022-08-18 NOTE — CONSULT NOTE ADULT - ASSESSMENT
Hematology/Oncology consulted for patient who is under care of Dr. Chuck Avalos of [facility] for the evaluation of a rectosigmoid mass with enlarged lymph nodes found on colonoscopy done 08/03. Last office visit was 08/04 when pathology was still pending. Per patient, final pathology showed rectal cancer, so he presents here for scheduled LAR. Patient had the surgery done 08/15, and reports feeling pretty well this morning. Reports minimal abdominal pain, otherwise no complaints.     Rectal cancer  -- Large rectosigmoid mass noted during colonoscopy 08/03; MRI showed metastatic adenopathy   -- s/p LAR 08/15/22  -- Continued management per surgery  -- After discharge, patient to follow up with oncologist Dr. Avalos for additional treatment   -- PET/CT as outpatient       Magali Martinez PA-C  Hematology/Oncology  [facility]  [phone] (office)  [phone] (alt office)  Evenings and weekends please call MD on call or office  
Mr. Larkin is a 25 yo male with no significant PMHx/SHx who was recently diagnosed with rectal cancer on colonoscopy who presents for scheduled low anterior resection

## 2022-08-18 NOTE — PROGRESS NOTE ADULT - ASSESSMENT
26 year old man s/p Open LAR for rectal cancer, POD#2, recovering well on the floors.    PLAN:  - Diet: Clears  - Pain Control: IV Tylenol/Dilaudid  - Activity: encourage Out of bed, IS  - IVF: 75/hr LR  - VTE prophylaxis with Heparin subcutaneous    A Team Surgery [id number]    26 year old man s/p Open LAR for rectal cancer, POD#2, recovering well on the floors.    PLAN:  - F/u TOV  - Diet: Clears  - Pain Control: IV Tylenol/Dilaudid  - Activity: encourage Out of bed, IS  - IVF: 75/hr LR  - VTE prophylaxis with Heparin subcutaneous    A Team Surgery [id number]    26 year old man s/p Open LAR for rectal cancer, POD#3, recovering well on the floors.    PLAN:  - Pain control PRN  - IVF until tolerating enough PO  - Diet: Clears  - Activity: encourage Out of bed, IS  - VTE prophylaxis with Heparin subcutaneous  - MAICOL drain teaching    A Team Surgery  [id number]

## 2022-08-18 NOTE — PROGRESS NOTE ADULT - SUBJECTIVE AND OBJECTIVE BOX
Overnight events:  -None Overnight events:  -French d/c'd at 00:30, TOV at 8:30 A TEAM Surgery Progress Note  Patient is a 26y old  Male who presents with a chief complaint of Scheduled LAR (18 Aug 2022 04:12)      INTERVAL EVENTS: Patient is POD#3 s/p open LAR. Braswell d/c'd at 00:30, passed trial of void.   SUBJECTIVE: Patient seen and examined at bedside with surgical team, patient without complaints. Abdomina pain is tolerable on current regimen. Denies nausea, vomiting. Passing flatus. Denies bowel movements. Denies fever, chills, CP, SOB.    OBJECTIVE:    Vital Signs Last 24 Hrs  T(C): 36.7 (18 Aug 2022 06:25), Max: 37 (17 Aug 2022 17:28)  T(F): 98.1 (18 Aug 2022 06:25), Max: 98.6 (17 Aug 2022 17:28)  HR: 87 (18 Aug 2022 06:25) (87 - 94)  BP: 138/84 (18 Aug 2022 06:25) (117/57 - 138/84)  BP(mean): --  RR: 18 (18 Aug 2022 06:25) (16 - 19)  SpO2: 95% (18 Aug 2022 06:25) (95% - 99%)    Parameters below as of 18 Aug 2022 06:25  Patient On (Oxygen Delivery Method): room air    I&O's Detail    17 Aug 2022 07:01  -  18 Aug 2022 07:00  --------------------------------------------------------  IN:    IV PiggyBack: 100 mL    Lactated Ringers: 1650 mL    Oral Fluid: 890 mL  Total IN: 2640 mL    OUT:    Bulb (mL): 65 mL    Indwelling Catheter - Urethral (mL): 3900 mL    Voided (mL): 600 mL  Total OUT: 4565 mL    Total NET: -1925 mL      MEDICATIONS  (STANDING):  acetaminophen   IVPB .. 1000 milliGRAM(s) IV Intermittent every 6 hours  enoxaparin Injectable 40 milliGRAM(s) SubCutaneous every 24 hours  lactated ringers. 1000 milliLiter(s) (75 mL/Hr) IV Continuous <Continuous>  pantoprazole  Injectable 40 milliGRAM(s) IV Push daily    MEDICATIONS  (PRN):  HYDROmorphone  Injectable 0.5 milliGRAM(s) IV Push every 3 hours PRN Moderate Pain (4 - 6)  HYDROmorphone  Injectable 1 milliGRAM(s) IV Push every 3 hours PRN Severe Pain (7 - 10)  naloxone Injectable 0.1 milliGRAM(s) IV Push every 3 minutes PRN For ANY of the following changes in patient status:  A. RR LESS THAN 10 breaths per minute, B. Oxygen saturation LESS THAN 90%, C. Sedation score of 6  ondansetron Injectable 4 milliGRAM(s) IV Push every 6 hours PRN Nausea      PHYSICAL EXAM:  Constitutional: A&Ox3, NAD  Respiratory: Unlabored breathing  Abdomen: Softly distended, NTTP. No rebound or guarding. Incision C/D/I. MAICOL drain with serosanguinous output.  Extremities: WWP, PAUL spontaneously    LABS:                        12.7   9.57  )-----------( 352      ( 18 Aug 2022 07:23 )             41.0     08-18    140  |  102  |  4<L>  ----------------------------<  97  4.3   |  29  |  0.86    Ca    9.2      18 Aug 2022 07:23  Phos  2.5     08-17  Mg     1.90     08-18      PT/INR - ( 17 Aug 2022 07:05 )   PT: 13.9 sec;   INR: 1.20 ratio         PTT - ( 17 Aug 2022 07:05 )  PTT:27.1 sec

## 2022-08-19 ENCOUNTER — TRANSCRIPTION ENCOUNTER (OUTPATIENT)
Age: 27
End: 2022-08-19

## 2022-08-19 LAB
ANION GAP SERPL CALC-SCNC: 12 MMOL/L — SIGNIFICANT CHANGE UP (ref 7–14)
BUN SERPL-MCNC: 5 MG/DL — LOW (ref 7–23)
CALCIUM SERPL-MCNC: 9.3 MG/DL — SIGNIFICANT CHANGE UP (ref 8.4–10.5)
CHLORIDE SERPL-SCNC: 101 MMOL/L — SIGNIFICANT CHANGE UP (ref 98–107)
CO2 SERPL-SCNC: 25 MMOL/L — SIGNIFICANT CHANGE UP (ref 22–31)
CREAT SERPL-MCNC: 0.81 MG/DL — SIGNIFICANT CHANGE UP (ref 0.5–1.3)
EGFR: 125 ML/MIN/1.73M2 — SIGNIFICANT CHANGE UP
GLUCOSE SERPL-MCNC: 100 MG/DL — HIGH (ref 70–99)
HCT VFR BLD CALC: 40 % — SIGNIFICANT CHANGE UP (ref 39–50)
HGB BLD-MCNC: 12.8 G/DL — LOW (ref 13–17)
MAGNESIUM SERPL-MCNC: 1.9 MG/DL — SIGNIFICANT CHANGE UP (ref 1.6–2.6)
MCHC RBC-ENTMCNC: 25.6 PG — LOW (ref 27–34)
MCHC RBC-ENTMCNC: 32 GM/DL — SIGNIFICANT CHANGE UP (ref 32–36)
MCV RBC AUTO: 80 FL — SIGNIFICANT CHANGE UP (ref 80–100)
NRBC # BLD: 0 /100 WBCS — SIGNIFICANT CHANGE UP (ref 0–0)
NRBC # FLD: 0 K/UL — SIGNIFICANT CHANGE UP (ref 0–0)
PHOSPHATE SERPL-MCNC: 3.9 MG/DL — SIGNIFICANT CHANGE UP (ref 2.5–4.5)
PLATELET # BLD AUTO: 364 K/UL — SIGNIFICANT CHANGE UP (ref 150–400)
POTASSIUM SERPL-MCNC: 4 MMOL/L — SIGNIFICANT CHANGE UP (ref 3.5–5.3)
POTASSIUM SERPL-SCNC: 4 MMOL/L — SIGNIFICANT CHANGE UP (ref 3.5–5.3)
RBC # BLD: 5 M/UL — SIGNIFICANT CHANGE UP (ref 4.2–5.8)
RBC # FLD: 13.3 % — SIGNIFICANT CHANGE UP (ref 10.3–14.5)
SODIUM SERPL-SCNC: 138 MMOL/L — SIGNIFICANT CHANGE UP (ref 135–145)
WBC # BLD: 9.32 K/UL — SIGNIFICANT CHANGE UP (ref 3.8–10.5)
WBC # FLD AUTO: 9.32 K/UL — SIGNIFICANT CHANGE UP (ref 3.8–10.5)

## 2022-08-19 RX ORDER — OXYCODONE HYDROCHLORIDE 5 MG/1
1 TABLET ORAL
Qty: 12 | Refills: 0
Start: 2022-08-19 | End: 2022-08-21

## 2022-08-19 RX ORDER — ACETAMINOPHEN 500 MG
1000 TABLET ORAL EVERY 6 HOURS
Refills: 0 | Status: DISCONTINUED | OUTPATIENT
Start: 2022-08-19 | End: 2022-08-20

## 2022-08-19 RX ORDER — ACETAMINOPHEN 500 MG
2 TABLET ORAL
Qty: 0 | Refills: 0 | DISCHARGE
Start: 2022-08-19

## 2022-08-19 RX ADMIN — Medication 1000 MILLIGRAM(S): at 18:11

## 2022-08-19 RX ADMIN — Medication 400 MILLIGRAM(S): at 05:06

## 2022-08-19 RX ADMIN — ENOXAPARIN SODIUM 40 MILLIGRAM(S): 100 INJECTION SUBCUTANEOUS at 23:50

## 2022-08-19 RX ADMIN — OXYCODONE HYDROCHLORIDE 10 MILLIGRAM(S): 5 TABLET ORAL at 18:11

## 2022-08-19 RX ADMIN — ENOXAPARIN SODIUM 40 MILLIGRAM(S): 100 INJECTION SUBCUTANEOUS at 00:09

## 2022-08-19 RX ADMIN — Medication 400 MILLIGRAM(S): at 00:09

## 2022-08-19 RX ADMIN — OXYCODONE HYDROCHLORIDE 10 MILLIGRAM(S): 5 TABLET ORAL at 17:41

## 2022-08-19 RX ADMIN — Medication 1000 MILLIGRAM(S): at 00:39

## 2022-08-19 RX ADMIN — Medication 1000 MILLIGRAM(S): at 05:36

## 2022-08-19 RX ADMIN — PANTOPRAZOLE SODIUM 40 MILLIGRAM(S): 20 TABLET, DELAYED RELEASE ORAL at 06:10

## 2022-08-19 RX ADMIN — OXYCODONE HYDROCHLORIDE 10 MILLIGRAM(S): 5 TABLET ORAL at 10:38

## 2022-08-19 RX ADMIN — OXYCODONE HYDROCHLORIDE 5 MILLIGRAM(S): 5 TABLET ORAL at 23:50

## 2022-08-19 RX ADMIN — Medication 1000 MILLIGRAM(S): at 12:35

## 2022-08-19 RX ADMIN — Medication 1000 MILLIGRAM(S): at 23:48

## 2022-08-19 RX ADMIN — Medication 1000 MILLIGRAM(S): at 17:41

## 2022-08-19 RX ADMIN — Medication 1000 MILLIGRAM(S): at 13:05

## 2022-08-19 RX ADMIN — OXYCODONE HYDROCHLORIDE 10 MILLIGRAM(S): 5 TABLET ORAL at 03:58

## 2022-08-19 RX ADMIN — OXYCODONE HYDROCHLORIDE 10 MILLIGRAM(S): 5 TABLET ORAL at 10:08

## 2022-08-19 NOTE — DISCHARGE NOTE PROVIDER - NSDCCPCAREPLAN_GEN_ALL_CORE_FT
PRINCIPAL DISCHARGE DIAGNOSIS  Diagnosis: Rectal mass  Assessment and Plan of Treatment: DRAIN CARE: Tip Garcia drain (MAICOL Drain) If you go home with a MAICOL drain it is important that you measure and record the fluid that is in it (output) on the output record sheet. Please bring the output record sheet to your follow-up appointment. Keep the drain secured to your clothing with a safety pin at all times to avoid accidental displacement. Monitor the insertion site for redness, pain, swelling, or purulent drainage.  You may shower with the drain. Wash around the drain with soap and water, pat dry, and reapply dressing. If you noticed a sudden change in the color or amount of fluid in the drain, please contact your surgeon’s office.  Your drain will be removed at an outpatient follow up appointment.   WOUND CARE:  Please keep incisions clean and dry. Please do not Scrub or rub incisions. Do not use lotion or powder on incisions.   BATHING: You may shower and/or sponge bathe. You may use warm soapy water in the shower and rinse, pat dry.  ACTIVITY: No heavy lifting or straining. Otherwise, you may return to your usual level of physical activity. If you are taking narcotic pain medication DO NOT drive a car, operate machinery or make important decisions.  DIET: Return to your usual diet.  NOTIFY YOUR SURGEON IF YOU HAVE: any bleeding that does not stop, any pus draining from your wound(s), any fever (over 100.4 F) persistent nausea/vomiting, or if your pain is not controlled on your discharge pain medications, unable to urinate.  FOLLOW UP:  1. Please follow up with your primary care physician in one week regarding your hospitalization, bring copies of your discharge paperwork.  2. Please follow up with your surgeon, Dr. Stark. Call to make an appointment [phone]

## 2022-08-19 NOTE — DISCHARGE NOTE PROVIDER - HOSPITAL COURSE
This patient is a 26M with rectosigmoid mass who presented to [facility] on 8/15/22. Patient taken to OR by Dr. Stark and underwent rectosigmoid resection. Patient tolerated operation well and there were no post-operative complications identified. Patient remained hemodynamically stable in the PACU and transferred to the surgical floor. Diet was restarted and advanced as tolerated. Pain control was transitioned from IV to PO pain meds. At this time, patient is currently ambulating, voiding, tolerating a regular diet. Pain well controlled on PO pain meds. Patient has been deemed stable for discharge home with follow up as an outpatient.     iSTOP Reference #: [id number] This patient is a 26M with rectosigmoid mass who presented to [facility] on 8/15/22. Patient taken to OR by Dr. Stark and underwent rectosigmoid resection. Patient tolerated operation well and there were no post-operative complications identified. Patient remained hemodynamically stable in the PACU and transferred to the surgical floor. Diet was restarted and advanced as tolerated. Pain control was transitioned from IV to PO pain meds. At this time, patient is currently ambulating, voiding, tolerating a regular diet. Pain well controlled on PO pain meds. Patient has been deemed stable for discharge home with follow up as an outpatient.     iSTOP Reference #: [id number]    ICU Vital Signs Last 24 Hrs  T(C): 36.7 (20 Aug 2022 06:19), Max: 36.9 (20 Aug 2022 02:25)  T(F): 98.1 (20 Aug 2022 06:19), Max: 98.4 (20 Aug 2022 02:25)  HR: 89 (20 Aug 2022 06:19) (74 - 96)  BP: 128/76 (20 Aug 2022 06:19) (118/76 - 130/68)  BP(mean): --  ABP: --  ABP(mean): --  RR: 18 (20 Aug 2022 06:19) (18 - 21)  SpO2: 99% (20 Aug 2022 06:19) (96% - 99%)    O2 Parameters below as of 20 Aug 2022 06:19  Patient On (Oxygen Delivery Method): room air

## 2022-08-19 NOTE — PROGRESS NOTE ADULT - ASSESSMENT
Mr. Larkin is a 25 yo male with no significant PMHx/SHx who was recently diagnosed with rectal cancer on colonoscopy who presents for scheduled low anterior resection

## 2022-08-19 NOTE — PROGRESS NOTE ADULT - ASSESSMENT
26 year old man s/p Open LAR for rectal cancer, POD#3, recovering well on the floors.    PLAN:  - Pain control PRN  - IVF until tolerating enough PO  - Diet: Clears  - Activity: encourage Out of bed, IS  - VTE prophylaxis with Heparin subcutaneous  - MAICOL drain teaching    A Team Surgery  [id number]    26 year old man s/p Open LAR for rectal cancer, POD#4, recovering well on the floors. Pain is controlled currently and making effort to ambulate throughout the day    PLAN:  - Pain control PRN  - IVL  - Diet: Advance to LRD  - Activity: encourage Out of bed, IS  - VTE prophylaxis with Heparin subcutaneous    A Team Surgery  [id number]    26 year old man s/p Open LAR for rectal cancer, POD#4, recovering well on the floors. Pain is controlled currently and making effort to ambulate throughout the day    PLAN:  - Aquacel over incision (day 4/5)  - Pain control PRN  - IVL  - Diet: Advance to LRD  - Activity: encourage Out of bed, IS  - VTE prophylaxis with Heparin subcutaneous    A Team Surgery  [id number]

## 2022-08-19 NOTE — PROGRESS NOTE ADULT - SUBJECTIVE AND OBJECTIVE BOX
Patient is a 26y old  Male who presents with a chief complaint of rectal ca (19 Aug 2022 12:59)    Patient seen this morning with his mother at bedside. He feels well.     MEDICATIONS  (STANDING):  acetaminophen     Tablet .. 1000 milliGRAM(s) Oral every 6 hours  enoxaparin Injectable 40 milliGRAM(s) SubCutaneous every 24 hours  pantoprazole    Tablet 40 milliGRAM(s) Oral before breakfast    MEDICATIONS  (PRN):  naloxone Injectable 0.1 milliGRAM(s) IV Push every 3 minutes PRN For ANY of the following changes in patient status:  A. RR LESS THAN 10 breaths per minute, B. Oxygen saturation LESS THAN 90%, C. Sedation score of 6  oxyCODONE    IR 5 milliGRAM(s) Oral every 6 hours PRN Moderate Pain (4 - 6)  oxyCODONE    IR 10 milliGRAM(s) Oral every 6 hours PRN Severe Pain (7 - 10)        Vital Signs Last 24 Hrs  T(C): 36.4 (19 Aug 2022 09:23), Max: 36.9 (18 Aug 2022 21:00)  T(F): 97.5 (19 Aug 2022 09:23), Max: 98.5 (19 Aug 2022 05:01)  HR: 74 (19 Aug 2022 09:23) (70 - 90)  BP: 118/76 (19 Aug 2022 09:23) (116/70 - 147/89)  BP(mean): --  RR: 18 (19 Aug 2022 09:23) (17 - 18)  SpO2: 98% (19 Aug 2022 09:23) (98% - 99%)    Parameters below as of 19 Aug 2022 05:01  Patient On (Oxygen Delivery Method): room air        PE  NAD  Awake, alert  Anicteric, MMM  RRR  CTAB  No c/c/e  No rash grossly                          12.8   9.32  )-----------( 364      ( 19 Aug 2022 07:21 )             40.0       08-19    138  |  101  |  5<L>  ----------------------------<  100<H>  4.0   |  25  |  0.81    Ca    9.3      19 Aug 2022 07:21  Phos  3.9     08-19  Mg     1.90     08-19

## 2022-08-19 NOTE — DISCHARGE NOTE PROVIDER - NSDCMRMEDTOKEN_GEN_ALL_CORE_FT
acetaminophen 500 mg oral tablet: 2 tab(s) orally every 6 hours  oxyCODONE 5 mg oral tablet: 1 tab(s) orally every 6 hours, As Needed -Moderate Pain (4 - 6) - for severe pain (7-10)    iSTOP Reference #: [id number] MDD:4 tabs

## 2022-08-19 NOTE — DISCHARGE NOTE PROVIDER - NSDCCPTREATMENT_GEN_ALL_CORE_FT
PRINCIPAL PROCEDURE  Procedure: Colectomy, open, left  Findings and Treatment:        PRINCIPAL PROCEDURE  Procedure: Colectomy, open, left  Findings and Treatment: WOUND CARE:  Please keep incisions clean and dry. Please do not Scrub or rub incisions. Do not use lotion or powder on incisions.   BATHING: Please do not submerge wound underwater. You may shower and/or sponge bathe.  ACTIVITY: No heavy lifting or straining. Otherwise, you may return to your usual level of physical activity. If you are taking narcotic pain medication (such as Percocet) DO NOT drive a car, operate machinery or make important decisions.  DIET: Return to your usual diet.  NOTIFY YOUR SURGEON IF: You have any bleeding that does not stop, any pus draining from your wound(s), any fever (over 100.4 F) or chills, persistent nausea/vomiting, persistent diarrhea, or if your pain is not controlled on your discharge pain medications.  FOLLOW-UP: Please follow up with your primary care physician in one week regarding your hospitalization. Please follow-up with your surgeon, within 7 days following discharge- please call to schedule an appointment.

## 2022-08-19 NOTE — PROGRESS NOTE ADULT - NSPROGADDITIONALINFOA_GEN_ALL_CORE
discussed with patient in detail, expresses understanding of treatment plans.  discussed with patient's family at bedside in detail
discharge planning per surgery
discussed with patient in detail, expresses understanding of treatment plans.  discussed with patient's family at bedside in detail  discussed with surgery service

## 2022-08-19 NOTE — PROGRESS NOTE ADULT - SUBJECTIVE AND OBJECTIVE BOX
DATE OF SERVICE: 08-19-22 @ 09:57    INTERVAL HPI/OVERNIGHT EVENTS: Patient seen and examined, resting comfortably at bedside awake and alert. No acute events overnight. Patient reports tolerating diet without nausea, vomiting. Admits to passing flatus and having bowel movements. Patient endorses voiding without issues and has been out of bed and ambulating. Denies fever, chills, SOB, or chest pain. Pain well controlled.     STATUS POST:  Low Anterior Resection for rectal cancer    POST OPERATIVE DAY #: 4    MEDICATIONS  (STANDING):  acetaminophen     Tablet .. 1000 milliGRAM(s) Oral every 6 hours  enoxaparin Injectable 40 milliGRAM(s) SubCutaneous every 24 hours  pantoprazole    Tablet 40 milliGRAM(s) Oral before breakfast    MEDICATIONS  (PRN):  naloxone Injectable 0.1 milliGRAM(s) IV Push every 3 minutes PRN For ANY of the following changes in patient status:  A. RR LESS THAN 10 breaths per minute, B. Oxygen saturation LESS THAN 90%, C. Sedation score of 6  oxyCODONE    IR 5 milliGRAM(s) Oral every 6 hours PRN Moderate Pain (4 - 6)  oxyCODONE    IR 10 milliGRAM(s) Oral every 6 hours PRN Severe Pain (7 - 10)      Vital Signs Last 24 Hrs  T(C): 36.4 (19 Aug 2022 09:23), Max: 36.9 (18 Aug 2022 21:00)  T(F): 97.5 (19 Aug 2022 09:23), Max: 98.5 (19 Aug 2022 05:01)  HR: 74 (19 Aug 2022 09:23) (70 - 90)  BP: 118/76 (19 Aug 2022 09:23) (115/77 - 147/89)  BP(mean): --  RR: 18 (19 Aug 2022 09:23) (17 - 18)  SpO2: 98% (19 Aug 2022 09:23) (98% - 100%)    Parameters below as of 19 Aug 2022 05:01  Patient On (Oxygen Delivery Method): room air      I&O's Detail    18 Aug 2022 07:01  -  19 Aug 2022 07:00  --------------------------------------------------------  IN:    IV PiggyBack: 200 mL    Lactated Ringers: 1350 mL    Oral Fluid: 720 mL  Total IN: 2270 mL    OUT:    Bulb (mL): 82.5 mL    Voided (mL): 2870 mL  Total OUT: 2952.5 mL    Total NET: -682.5 mL      Physical Exam:  General: WN/WD NAD  Respiratory: airway patent, respirations unlabored, no increased WoB  Neurology: A&Ox3, nonfocal, PAUL x 4  Abdominal: Soft, appropriately tender to palpation, softly distended, no rebounding or guarding  Midline Incision C/D/I, no surrounding edema or erythema  MAICOL drain: patent and functioning well with SSF output      LABS:                        12.8   9.32  )-----------( 364      ( 19 Aug 2022 07:21 )             40.0     08-19    138  |  101  |  5<L>  ----------------------------<  100<H>  4.0   |  25  |  0.81    Ca    9.3      19 Aug 2022 07:21  Phos  3.9     08-19  Mg     1.90     08-19

## 2022-08-19 NOTE — PROGRESS NOTE ADULT - ASSESSMENT
Hematology/Oncology consulted for patient who is under care of Dr. Chuck Avalos of [facility] for the evaluation of a rectosigmoid mass with enlarged lymph nodes found on colonoscopy done 08/03. Last office visit was 08/04 when pathology was still pending. Per patient, final pathology showed rectal cancer, so he presents here for scheduled LAR. Patient had the surgery done 08/15, and reports feeling pretty well this morning. Reports minimal abdominal pain, otherwise no complaints.     Rectal cancer  -- Large rectosigmoid mass noted during colonoscopy 08/03; MRI showed metastatic adenopathy   -- s/p LAR 08/15/22  -- Continued management per surgery  -- After discharge, patient to follow up with oncologist Dr. Avalos for additional treatment and surveillance   -- PET/CT as outpatient   likely discharge tomorrow      Magali Martinez PA-C  Hematology/Oncology  [facility]  [phone] (office)  [phone] (alt office)  Evenings and weekends please call MD on call or office

## 2022-08-19 NOTE — PROGRESS NOTE ADULT - SUBJECTIVE AND OBJECTIVE BOX
Patient is a 26y old  Male who presents with a chief complaint of Rectosigmoid mass (19 Aug 2022 07:37)      DATE OF SERVICE: 08-19-22 @ 12:59    SUBJECTIVE / OVERNIGHT EVENTS: overnight events noted    ROS:  Resp: No cough no sputum production  CVS: No chest pain no palpitations no orthopnea  GI: no N/V/D  : no dysuria, no hematuria          MEDICATIONS  (STANDING):  acetaminophen     Tablet .. 1000 milliGRAM(s) Oral every 6 hours  enoxaparin Injectable 40 milliGRAM(s) SubCutaneous every 24 hours  pantoprazole    Tablet 40 milliGRAM(s) Oral before breakfast    MEDICATIONS  (PRN):  naloxone Injectable 0.1 milliGRAM(s) IV Push every 3 minutes PRN For ANY of the following changes in patient status:  A. RR LESS THAN 10 breaths per minute, B. Oxygen saturation LESS THAN 90%, C. Sedation score of 6  oxyCODONE    IR 5 milliGRAM(s) Oral every 6 hours PRN Moderate Pain (4 - 6)  oxyCODONE    IR 10 milliGRAM(s) Oral every 6 hours PRN Severe Pain (7 - 10)        CAPILLARY BLOOD GLUCOSE        I&O's Summary    18 Aug 2022 07:01  -  19 Aug 2022 07:00  --------------------------------------------------------  IN: 2270 mL / OUT: 2952.5 mL / NET: -682.5 mL        Vital Signs Last 24 Hrs  T(C): 36.4 (19 Aug 2022 09:23), Max: 36.9 (18 Aug 2022 21:00)  T(F): 97.5 (19 Aug 2022 09:23), Max: 98.5 (19 Aug 2022 05:01)  HR: 74 (19 Aug 2022 09:23) (70 - 90)  BP: 118/76 (19 Aug 2022 09:23) (115/77 - 147/89)  BP(mean): --  RR: 18 (19 Aug 2022 09:23) (17 - 18)  SpO2: 98% (19 Aug 2022 09:23) (98% - 100%)    PHYSICAL EXAM:  EYES: EOMI, PERRLA  NECK: Supple, No JVD  CHEST/LUNG: clear   HEART: S1 S2; no murmurs   ABDOMEN: Soft, less tenderness   EXTREMITIES:  no edema  NEUROLOGY: AO x 3 non-focal  SKIN: No rashes or lesions    LABS:                        12.8   9.32  )-----------( 364      ( 19 Aug 2022 07:21 )             40.0     08-19    138  |  101  |  5<L>  ----------------------------<  100<H>  4.0   |  25  |  0.81    Ca    9.3      19 Aug 2022 07:21  Phos  3.9     08-19  Mg     1.90     08-19                  All consultant(s) notes reviewed and care discussed with other providers        Contact Number, Dr White [phone]

## 2022-08-19 NOTE — PROGRESS NOTE ADULT - ASSESSMENT
Patient with his mother present, seen and examined with Dr. Soliz    26 year old man s/p Open LAR for rectal cancer, POD#4, recovering well on the floors.    PLAN:  - Diet: LRD  - Activity: encourage Out of bed, IS  - VTE prophylaxis with Heparin subcutaneous  - Pain control PRN    A Team Surgery [id number]

## 2022-08-19 NOTE — PROGRESS NOTE ADULT - SUBJECTIVE AND OBJECTIVE BOX
Overnight events:  -None Surgery Progress Note    SUBJECTIVE:   Pt seen and examined at bedside. Patient comfortable and in no-apparent distress. No nausea, vomiting, diarrhea. Pain is controlled. +Gas/+BM. Tolerating CLD diet.    OBJECTIVE:  Vital Signs Last 24 Hrs  T(C): 36.9 (19 Aug 2022 05:01), Max: 36.9 (18 Aug 2022 21:00)  T(F): 98.5 (19 Aug 2022 05:01), Max: 98.5 (19 Aug 2022 05:01)  HR: 78 (19 Aug 2022 05:01) (70 - 92)  BP: 116/70 (19 Aug 2022 05:01) (115/77 - 147/89)  BP(mean): --  RR: 17 (19 Aug 2022 05:01) (17 - 18)  SpO2: 99% (19 Aug 2022 05:01) (98% - 100%)    Parameters below as of 19 Aug 2022 05:01  Patient On (Oxygen Delivery Method): room air    Physical Exam:  General Appearance: Appears well, NAD  Respiratory: No labored breathing  CV: Pulse regularly present  Abdomen: Soft, nontender, incision covered with Aquacel, drain output serosanguinous    LABS:                        12.7   9.57  )-----------( 352      ( 18 Aug 2022 07:23 )             41.0     08-18    140  |  102  |  4<L>  ----------------------------<  97  4.3   |  29  |  0.86    Ca    9.2      18 Aug 2022 07:23  Phos  3.3     08-18  Mg     1.90     08-18      PT/INR - ( 17 Aug 2022 07:05 )   PT: 13.9 sec;   INR: 1.20 ratio    PTT - ( 17 Aug 2022 07:05 )  PTT:27.1 sec        RADIOLOGY & ADDITIONAL STUDIES:      INs and OUTs:    08-18-22 @ 07:01  -  08-19-22 @ 07:00  --------------------------------------------------------  IN: 2270 mL / OUT: 2952.5 mL / NET: -682.5 mL        MEDICATIONS  (STANDING):  acetaminophen   IVPB .. 1000 milliGRAM(s) IV Intermittent every 6 hours  enoxaparin Injectable 40 milliGRAM(s) SubCutaneous every 24 hours  pantoprazole    Tablet 40 milliGRAM(s) Oral before breakfast    MEDICATIONS  (PRN):  naloxone Injectable 0.1 milliGRAM(s) IV Push every 3 minutes PRN For ANY of the following changes in patient status:  A. RR LESS THAN 10 breaths per minute, B. Oxygen saturation LESS THAN 90%, C. Sedation score of 6  ondansetron Injectable 4 milliGRAM(s) IV Push every 6 hours PRN Nausea  oxyCODONE    IR 5 milliGRAM(s) Oral every 6 hours PRN Moderate Pain (4 - 6)  oxyCODONE    IR 10 milliGRAM(s) Oral every 6 hours PRN Severe Pain (7 - 10)

## 2022-08-19 NOTE — PROGRESS NOTE ADULT - ATTENDING COMMENTS
Pt seen/examined, overall doing well. Positive bowel function    - adv to LRD  - sl iv  - ambulate  - replace wound dressing
Pt seen/examined, agree with above. Positive flatus and BMs    - resume CLD

## 2022-08-20 ENCOUNTER — TRANSCRIPTION ENCOUNTER (OUTPATIENT)
Age: 27
End: 2022-08-20

## 2022-08-20 VITALS
DIASTOLIC BLOOD PRESSURE: 85 MMHG | HEART RATE: 100 BPM | TEMPERATURE: 98 F | RESPIRATION RATE: 18 BRPM | SYSTOLIC BLOOD PRESSURE: 129 MMHG | OXYGEN SATURATION: 99 %

## 2022-08-20 LAB
ANION GAP SERPL CALC-SCNC: 11 MMOL/L — SIGNIFICANT CHANGE UP (ref 7–14)
BUN SERPL-MCNC: 7 MG/DL — SIGNIFICANT CHANGE UP (ref 7–23)
CALCIUM SERPL-MCNC: 9.1 MG/DL — SIGNIFICANT CHANGE UP (ref 8.4–10.5)
CHLORIDE SERPL-SCNC: 101 MMOL/L — SIGNIFICANT CHANGE UP (ref 98–107)
CO2 SERPL-SCNC: 26 MMOL/L — SIGNIFICANT CHANGE UP (ref 22–31)
CREAT SERPL-MCNC: 0.76 MG/DL — SIGNIFICANT CHANGE UP (ref 0.5–1.3)
EGFR: 127 ML/MIN/1.73M2 — SIGNIFICANT CHANGE UP
GLUCOSE SERPL-MCNC: 99 MG/DL — SIGNIFICANT CHANGE UP (ref 70–99)
HCT VFR BLD CALC: 43.3 % — SIGNIFICANT CHANGE UP (ref 39–50)
HGB BLD-MCNC: 13.9 G/DL — SIGNIFICANT CHANGE UP (ref 13–17)
MAGNESIUM SERPL-MCNC: 2 MG/DL — SIGNIFICANT CHANGE UP (ref 1.6–2.6)
MCHC RBC-ENTMCNC: 25.4 PG — LOW (ref 27–34)
MCHC RBC-ENTMCNC: 32.1 GM/DL — SIGNIFICANT CHANGE UP (ref 32–36)
MCV RBC AUTO: 79.2 FL — LOW (ref 80–100)
NRBC # BLD: 0 /100 WBCS — SIGNIFICANT CHANGE UP (ref 0–0)
NRBC # FLD: 0 K/UL — SIGNIFICANT CHANGE UP (ref 0–0)
PHOSPHATE SERPL-MCNC: 4 MG/DL — SIGNIFICANT CHANGE UP (ref 2.5–4.5)
PLATELET # BLD AUTO: 397 K/UL — SIGNIFICANT CHANGE UP (ref 150–400)
POTASSIUM SERPL-MCNC: 3.8 MMOL/L — SIGNIFICANT CHANGE UP (ref 3.5–5.3)
POTASSIUM SERPL-SCNC: 3.8 MMOL/L — SIGNIFICANT CHANGE UP (ref 3.5–5.3)
RBC # BLD: 5.47 M/UL — SIGNIFICANT CHANGE UP (ref 4.2–5.8)
RBC # FLD: 13.3 % — SIGNIFICANT CHANGE UP (ref 10.3–14.5)
SODIUM SERPL-SCNC: 138 MMOL/L — SIGNIFICANT CHANGE UP (ref 135–145)
WBC # BLD: 9.46 K/UL — SIGNIFICANT CHANGE UP (ref 3.8–10.5)
WBC # FLD AUTO: 9.46 K/UL — SIGNIFICANT CHANGE UP (ref 3.8–10.5)

## 2022-08-20 RX ADMIN — Medication 1000 MILLIGRAM(S): at 11:18

## 2022-08-20 RX ADMIN — Medication 1000 MILLIGRAM(S): at 05:20

## 2022-08-20 RX ADMIN — Medication 1000 MILLIGRAM(S): at 05:50

## 2022-08-20 RX ADMIN — PANTOPRAZOLE SODIUM 40 MILLIGRAM(S): 20 TABLET, DELAYED RELEASE ORAL at 05:20

## 2022-08-20 RX ADMIN — Medication 1000 MILLIGRAM(S): at 00:18

## 2022-08-20 RX ADMIN — Medication 1000 MILLIGRAM(S): at 11:46

## 2022-08-20 RX ADMIN — OXYCODONE HYDROCHLORIDE 10 MILLIGRAM(S): 5 TABLET ORAL at 11:46

## 2022-08-20 RX ADMIN — OXYCODONE HYDROCHLORIDE 5 MILLIGRAM(S): 5 TABLET ORAL at 00:20

## 2022-08-20 RX ADMIN — OXYCODONE HYDROCHLORIDE 10 MILLIGRAM(S): 5 TABLET ORAL at 11:16

## 2022-08-20 NOTE — PROGRESS NOTE ADULT - PROBLEM SELECTOR PLAN 2
likely reactive to surgery  continue to monitor   on antibiotics
likely reactive to surgery  resolved   continue to monitor   off antibiotics
likely reactive to surgery  continue to monitor   on antibiotics

## 2022-08-20 NOTE — PROGRESS NOTE ADULT - SUBJECTIVE AND OBJECTIVE BOX
Patient is a 26y old  Male who presents with a chief complaint of Scheduled LAR (20 Aug 2022 03:41)      DATE OF SERVICE: 08-20-22 @ 09:44    SUBJECTIVE / OVERNIGHT EVENTS: overnight events noted    ROS:  Resp: No cough no sputum production  CVS: No chest pain no palpitations no orthopnea  GI: no N/V/D  : no dysuria, no hematuria          MEDICATIONS  (STANDING):  acetaminophen     Tablet .. 1000 milliGRAM(s) Oral every 6 hours  enoxaparin Injectable 40 milliGRAM(s) SubCutaneous every 24 hours  pantoprazole    Tablet 40 milliGRAM(s) Oral before breakfast    MEDICATIONS  (PRN):  naloxone Injectable 0.1 milliGRAM(s) IV Push every 3 minutes PRN For ANY of the following changes in patient status:  A. RR LESS THAN 10 breaths per minute, B. Oxygen saturation LESS THAN 90%, C. Sedation score of 6  oxyCODONE    IR 5 milliGRAM(s) Oral every 6 hours PRN Moderate Pain (4 - 6)  oxyCODONE    IR 10 milliGRAM(s) Oral every 6 hours PRN Severe Pain (7 - 10)        CAPILLARY BLOOD GLUCOSE        I&O's Summary    19 Aug 2022 07:01  -  20 Aug 2022 07:00  --------------------------------------------------------  IN: 660 mL / OUT: 320 mL / NET: 340 mL        Vital Signs Last 24 Hrs  T(C): 36.7 (20 Aug 2022 06:19), Max: 36.9 (20 Aug 2022 02:25)  T(F): 98.1 (20 Aug 2022 06:19), Max: 98.4 (20 Aug 2022 02:25)  HR: 89 (20 Aug 2022 06:19) (87 - 96)  BP: 128/76 (20 Aug 2022 06:19) (123/67 - 130/68)  BP(mean): --  RR: 18 (20 Aug 2022 06:19) (18 - 21)  SpO2: 99% (20 Aug 2022 06:19) (96% - 99%)    PHYSICAL EXAM:  EYES: EOMI, PERRLA  NECK: Supple, No JVD  CHEST/LUNG: clear   HEART: S1 S2; no murmurs   ABDOMEN: Soft, no tenderness   EXTREMITIES:  no edema  NEUROLOGY: AO x 3 non-focal  SKIN: No rashes or lesions    LABS:                        13.9   9.46  )-----------( 397      ( 20 Aug 2022 06:57 )             43.3     08-20    138  |  101  |  7   ----------------------------<  99  3.8   |  26  |  0.76    Ca    9.1      20 Aug 2022 06:57  Phos  4.0     08-20  Mg     2.00     08-20                  All consultant(s) notes reviewed and care discussed with other providers        Contact Number, Dr White [phone]

## 2022-08-20 NOTE — PROGRESS NOTE ADULT - PROBLEM SELECTOR PLAN 1
likely multifactorial including post outpatient status, meds, opioids Flagyl  pantoprazole IV to minimize gastritis  continue to monitor
resolved   continue pantoprazole
resolved   continue pantoprazole
likely multifactorial including post outpatient status, meds, opioids Flagyl  continue pantoprazole  resolved
resolved   continue pantoprazole

## 2022-08-20 NOTE — DISCHARGE NOTE NURSING/CASE MANAGEMENT/SOCIAL WORK - PATIENT PORTAL LINK FT
You can access the FollowMyHealth Patient Portal offered by [facility] by registering at the following website: [email or web address]. By joining [employer]’s FollowMyHealth portal, you will also be able to view your health information using other applications (apps) compatible with our system.

## 2022-08-20 NOTE — PROGRESS NOTE ADULT - REASON FOR ADMISSION
rectal ca
Scheduled LAR
scheduled low anterior resection
Scheduled LAR
scheduled low anterior resection
Scheduled LAR

## 2022-08-20 NOTE — PROGRESS NOTE ADULT - PROBLEM SELECTOR PLAN 3
s/p surgery  defer recommendations to surgery

## 2022-08-20 NOTE — PROGRESS NOTE ADULT - ASSESSMENT
26 year old man s/p Open LAR for rectal cancer, POD#4, recovering well on the floors. Pain is controlled currently and making effort to ambulate throughout the day    PLAN:  - Aquacel over incision (day 5/5)  - Pain control PRN  - Diet: LRD  - Activity: encourage Out of bed, IS  - VTE prophylaxis with Heparin subcutaneous  - Discharge today    [facility] Surgery  [id number]    26 year old man s/p Open LAR for rectal cancer on 8/15, recovering well on the floors. Pain is controlled currently and making effort to ambulate throughout the day    PLAN:  - Aquacel over incision removed 8/20   - Pain control PRN  - Diet: LRD  - Activity: encourage Out of bed, IS  - VTE prophylaxis with Heparin subcutaneous  - Discharge today 8/20    [facility] Surgery  [id number]

## 2022-08-20 NOTE — PROGRESS NOTE ADULT - PROVIDER SPECIALTY LIST ADULT
Colorectal Surgery
Colorectal Surgery
Internal Medicine
Pain Medicine
Colorectal Surgery
Heme/Onc
Internal Medicine
Pain Medicine
Surgery
Surgery
Colorectal Surgery
Internal Medicine
Surgery
Internal Medicine
Internal Medicine

## 2022-08-20 NOTE — DISCHARGE NOTE NURSING/CASE MANAGEMENT/SOCIAL WORK - NSDCPEFALRISK_GEN_ALL_CORE
For information on Fall & Injury Prevention, visit: [email or web address] OR  [email or web address] OR  https://www.cdc.gov/steadi/patient.html

## 2022-08-26 LAB — SURGICAL PATHOLOGY STUDY: SIGNIFICANT CHANGE UP

## 2022-09-02 ENCOUNTER — OUTPATIENT (OUTPATIENT)
Dept: OUTPATIENT SERVICES | Facility: HOSPITAL | Age: 27
LOS: 1 days | End: 2022-09-02

## 2022-09-02 VITALS
SYSTOLIC BLOOD PRESSURE: 133 MMHG | DIASTOLIC BLOOD PRESSURE: 85 MMHG | HEIGHT: 69 IN | RESPIRATION RATE: 16 BRPM | TEMPERATURE: 98 F | WEIGHT: 235.01 LBS | HEART RATE: 77 BPM | OXYGEN SATURATION: 98 %

## 2022-09-02 DIAGNOSIS — C19 MALIGNANT NEOPLASM OF RECTOSIGMOID JUNCTION: ICD-10-CM

## 2022-09-02 DIAGNOSIS — Z90.49 ACQUIRED ABSENCE OF OTHER SPECIFIED PARTS OF DIGESTIVE TRACT: Chronic | ICD-10-CM

## 2022-09-02 DIAGNOSIS — Z01.812 ENCOUNTER FOR PREPROCEDURAL LABORATORY EXAMINATION: ICD-10-CM

## 2022-09-02 DIAGNOSIS — Z98.890 OTHER SPECIFIED POSTPROCEDURAL STATES: Chronic | ICD-10-CM

## 2022-09-02 RX ORDER — SODIUM CHLORIDE 9 MG/ML
1000 INJECTION, SOLUTION INTRAVENOUS
Refills: 0 | Status: DISCONTINUED | OUTPATIENT
Start: 2022-09-15 | End: 2022-09-16

## 2022-09-02 NOTE — H&P PST ADULT - NSICDXPASTMEDICALHX_GEN_ALL_CORE_FT
PAST MEDICAL HISTORY:  Acne     Ankle injury ' 2016  Right    Peritonsillar abscess      PAST MEDICAL HISTORY:  Acne     Ankle injury ' 2016  Right    Malignant neoplasm of rectosigmoid junction     Peritonsillar abscess

## 2022-09-02 NOTE — H&P PST ADULT - HISTORY OF PRESENT ILLNESS
25y/o male presents for preop eval for scheduled insertion of infusaport.  Pt is s/p colon resection on 8/15/22 for Malignant neoplasm of rectosigmoid junction.

## 2022-09-02 NOTE — H&P PST ADULT - NSICDXPASTSURGICALHX_GEN_ALL_CORE_FT
PAST SURGICAL HISTORY:  No significant past surgical history      PAST SURGICAL HISTORY:  History of ankle surgery 2017    S/P colon resection 8/15/22

## 2022-09-02 NOTE — H&P PST ADULT - PROBLEM SELECTOR PLAN 1
Scheduled for insertion of infusaport 9/15/22  Written & verbal preop instructions, gi prophylaxis & surgical soap given  Pt verbalized good understanding.  Teach back done on surgical soap instructions.

## 2022-10-04 NOTE — PATIENT PROFILE ADULT - PRO INTERPRETER NEED 2
You can access the FollowMyHealth Patient Portal offered by [facility] by registering at the following website: [email or web address]. By joining [employer]’s FollowMyHealth portal, you will also be able to view your health information using other applications (apps) compatible with our system.
You can access the FollowMyHealth Patient Portal offered by [facility] by registering at the following website: [email or web address]. By joining [employer]’s FollowMyHealth portal, you will also be able to view your health information using other applications (apps) compatible with our system.
English

## 2023-01-04 ENCOUNTER — EMERGENCY (EMERGENCY)
Facility: HOSPITAL | Age: 28
LOS: 1 days | Discharge: HOME | End: 2023-01-04
Admitting: EMERGENCY MEDICINE
Payer: MEDICAID

## 2023-01-04 ENCOUNTER — FORM ENCOUNTER (OUTPATIENT)
Age: 28
End: 2023-01-04

## 2023-01-04 ENCOUNTER — APPOINTMENT (OUTPATIENT)
Dept: ORTHOPEDIC SURGERY | Facility: CLINIC | Age: 28
End: 2023-01-04
Payer: MEDICAID

## 2023-01-04 VITALS
SYSTOLIC BLOOD PRESSURE: 130 MMHG | OXYGEN SATURATION: 100 % | TEMPERATURE: 98 F | HEART RATE: 97 BPM | RESPIRATION RATE: 16 BRPM | DIASTOLIC BLOOD PRESSURE: 95 MMHG

## 2023-01-04 VITALS — HEIGHT: 69 IN | BODY MASS INDEX: 34.8 KG/M2 | WEIGHT: 235 LBS

## 2023-01-04 DIAGNOSIS — Z90.49 ACQUIRED ABSENCE OF OTHER SPECIFIED PARTS OF DIGESTIVE TRACT: Chronic | ICD-10-CM

## 2023-01-04 DIAGNOSIS — S62.141A DISPLACED FRACTURE OF BODY OF HAMATE [UNCIFORM] BONE, RIGHT WRIST, INITIAL ENCOUNTER FOR CLOSED FRACTURE: ICD-10-CM

## 2023-01-04 DIAGNOSIS — Z98.890 OTHER SPECIFIED POSTPROCEDURAL STATES: Chronic | ICD-10-CM

## 2023-01-04 PROBLEM — C19 MALIGNANT NEOPLASM OF RECTOSIGMOID JUNCTION: Chronic | Status: ACTIVE | Noted: 2022-09-02

## 2023-01-04 PROCEDURE — 99283 EMERGENCY DEPT VISIT LOW MDM: CPT

## 2023-01-04 PROCEDURE — 29125 APPL SHORT ARM SPLINT STATIC: CPT

## 2023-01-04 PROCEDURE — 99204 OFFICE O/P NEW MOD 45 MIN: CPT | Mod: 25

## 2023-01-04 NOTE — ED PROVIDER NOTE - OBJECTIVE STATEMENT
28 Y/O M w/ PMH of colon CA presents to ER w/ RT 4th metacarpal fracture. States he fell out of bed a few days ago. Seen at [facility] MD today and diagnosed w/ fracture placed in splint. Told to visit ER for cast. Denies fever, chills, numbness/tingling.

## 2023-01-04 NOTE — ED PROVIDER NOTE - PATIENT PORTAL LINK FT
You can access the FollowMyHealth Patient Portal offered by [facility] by registering at the following website: [email or web address]. By joining [employer]’s FollowMyHealth portal, you will also be able to view your health information using other applications (apps) compatible with our system.

## 2023-01-04 NOTE — ED PROVIDER NOTE - PHYSICAL EXAMINATION
Vital signs reviewed.   CONSTITUTIONAL: Well-appearing; well-nourished; in no apparent distress. Non-toxic appearing.   HEAD: Normocephalic, atraumatic.  EYES: Normal conjunctiva and no sclera injection noted  ENT: normal nose; no rhinorrhea.  CARD: Normal S1, S2  RESP: Normal chest excursion with respiration; breath sounds clear and equal bilaterally.  ABD/GI: soft, non-distended; non-tender  EXT/MS: RT wrist in splint. Cap refill +2. Sensation intact  SKIN: Normal for age and race; warm; dry; good turgor; no apparent lesions or exudate noted.  NEURO: Awake, alert, oriented x 3.  PSYCH: Normal mood; appropriate affect.

## 2023-01-04 NOTE — ED PROVIDER NOTE - CLINICAL SUMMARY MEDICAL DECISION MAKING FREE TEXT BOX
28 Y/O M w/ PMH of colon CA presents to ER w/ RT 4th metacarpal fracture.   Pt adamant about being placed in cast. Refuses re shooting of XR.   Ortho not covering hand today. Pt offered plastic referral  States he will follow up with his orthopedist 26 Y/O M w/ PMH of colon CA presents to ER w/ RT 4th metacarpal fracture.   Pt adamant about being placed in cast. Refuses re shooting of XR.   Ortho not covering hand today. Pt offered plastic referral  States he will follow up with his orthopedist  PT left prior to registration

## 2023-01-04 NOTE — ASSESSMENT
[FreeTextEntry1] : The condition was explained to the patient.\par \par Discussed risks and benefits of surgical vs non-surgical treatment. Patient would like to proceed with surgery.\par Risks of surgery include, but are not limited to bleeding, infection, persistent pain, stiffness, loss of ROM, arthritis, mal-union, non-union, need for future surgery, injury to surrounding tendons and neurovascular structures, hypersensitivity, cold sensitivity, CRPS, and loss of function. Risk of persistent instability and re-dislocation. Risk of hardware irritation or failure, which may require revision surgery. Risk of pin tract infection, which may require antibiotics, premature pin removal, or surgical debridement. Surgery also carries a risk of complications related to anesthesia. All patient questions were answered. Patient expressed understanding and would like to proceed with closed reduction percutaneous pinning of 4th and 5th CMCJ fracture-dislocation. Discussed that I may need to make a small incision over the fracture site to perform osteoclasis.\par - STAT CT R wrist for pre-operative planning.\par - applied orthoglass (pre-padded fiberglass) ulnar gutter splint. reviewed splint care instructions.\par - elevate hand above heart as much as possible to reduce swelling.\par - NWB to R hand.\par \par F/u after surgery. X-rays RIGHT hand out of splint.

## 2023-01-04 NOTE — IMAGING
[de-identified] : RIGHT HAND\par skin intact. mild swelling of hand. ecchymosis throughout palm.\par TTP to 4/5 CMCJ.\par good wrist extension, flexion. good pronation, supination.\par good EPL, FPL. RF/SF mild MPJ ext lag, otherwise good finger extension, flex 4cm to DPC. no scissoring. good finger abduction and adduction. \par SILT to median, ulnar, radial distribution. \par palpable radial pulse, brisk cap refill all digits.\par no triggering.\par \par \par XRAYS RIGHT HAND @[facility] 1/3/23 - 4th metacarpal base fx with dorsal dislocation of CMCJ, hamate fx. Rotation Flap Text: The defect edges were debeveled with a #15 scalpel blade.  Given the location of the defect, shape of the defect and the proximity to free margins a rotation flap was deemed most appropriate.  Using a sterile surgical marker, an appropriate rotation flap was drawn incorporating the defect and placing the expected incisions within the relaxed skin tension lines where possible.    The area thus outlined was incised deep to adipose tissue with a #15 scalpel blade.  The skin margins were undermined to an appropriate distance in all directions utilizing iris scissors.

## 2023-01-04 NOTE — HISTORY OF PRESENT ILLNESS
[] : yes [de-identified] : 1/4/23: 28yo RHD M ( - not working since colon cancer surgery) presents for RIGHT hand pain after he fell out of bed on 1/1/23. Denies numbness/tingling.\par Went to [facility] 1/3/23 => XR R hand, placed in splint. Patient has been wearing wrist wrap instead.\par \par Hx: colon cancer s/p resection 8/15/22 - chemo currently on hold. [FreeTextEntry1] : Right Hand  [FreeTextEntry3] : 01/01/2023 [FreeTextEntry5] : 27 year old male presents today with a broken wrist.  Pt states he broke it while sleeping on 01/01/2023 [de-identified] : X-Ray

## 2023-01-04 NOTE — ED ADULT TRIAGE NOTE - CHIEF COMPLAINT QUOTE
Pt  sent from   for cast for  right 4th metacarpal fracture, pt s/p fall on Saturday.  Denies hitting head, blood thinner use, loss of sensation, numbness or tingling. PMHx: Colon Ca on chemo last dose 2 weeks ago.

## 2023-01-04 NOTE — ED PROVIDER NOTE - NSICDXPASTMEDICALHX_GEN_ALL_CORE_FT
PAST MEDICAL HISTORY:  Acne     Ankle injury ' 2016  Right    Malignant neoplasm of rectosigmoid junction     Peritonsillar abscess

## 2023-01-05 ENCOUNTER — APPOINTMENT (OUTPATIENT)
Dept: CT IMAGING | Facility: CLINIC | Age: 28
End: 2023-01-05
Payer: MEDICAID

## 2023-01-05 PROCEDURE — 73200 CT UPPER EXTREMITY W/O DYE: CPT | Mod: RT

## 2023-01-05 PROCEDURE — 76376 3D RENDER W/INTRP POSTPROCES: CPT

## 2023-01-16 ENCOUNTER — APPOINTMENT (OUTPATIENT)
Dept: ORTHOPEDIC SURGERY | Facility: CLINIC | Age: 28
End: 2023-01-16
Payer: MEDICAID

## 2023-01-16 PROCEDURE — 73130 X-RAY EXAM OF HAND: CPT | Mod: RT

## 2023-01-16 PROCEDURE — 99203 OFFICE O/P NEW LOW 30 MIN: CPT

## 2023-01-16 NOTE — PHYSICAL EXAM
[de-identified] : Patient is WDWN, alert, and in no acute distress. Breathing is unlabored. He is grossly oriented to person, place, and time.\par \par He is accompanied by his wife today.\par \par Right Hand:\par Mild edema dorsally to the hand.\par No obvious ecchymosis.\par Tenderness to palpation along the fourth and fifth CMC joints.\par Sensation is intact to the light touch along the median, ulnar and radial distribution. [de-identified] : AP, lateral and oblique views of the RIGHT hand were obtained today and revealed a dorsal dislocation of the fourth and fifth CMC joints. \par \par ------------------------------------------------------------------------------------------------------------------------------------------------------------------------------------ \par \par Imaging obtained at [facility] of the right hand on 1/3/23. THese revealed 4th metacarpal base fx with dorsal dislocation of CMCJ, hamate fx. \par \par ------------------------------------------------------------------------------------------------------------------------------------------------------------------------------------ \par \par CT OF RIGHT WRIST \par DATE OF EXAM: 1/5/23\par IMPRESSION:\par Displaced fractures with malalignment at the fourth and fifth CMC with dorsal dislocation at the base of the fourth metacarpal and a displaced volar bone fragment dorsally distal hamate fracture and fifth CMC joint space narrowing with severe surrounding soft tissue swelling in the area. Correlation with plan films is recommended.\par Potential slight fracture of the proximal triquetrum nondisplaced without malalignment in the wrist.

## 2023-01-16 NOTE — HISTORY OF PRESENT ILLNESS
[de-identified] : Pt is a RHD 28 y/o male with right hand pain after a fall out of bed on 12/31/22. He initially was seen at [facility] on 1/3/23, where xrays were obtained and revealed 4th metacarpal base fx with dorsal dislocation of CMCJ, hamate fx. He was splinted and advised to follow up with a specialist. He then was seen at [facility]note 3] by Dr. Aga Kate on 1/4/23 who recommended surgery. She also obtained at CT. He presents to the office today on 1/16/23 for a second opinion. \par \par He has a medical history of colon cancer s/p resection 8/15/22. Chemotherapy is currently on hold. \par \par He is a .

## 2023-01-16 NOTE — ADDENDUM
[FreeTextEntry1] : I, Yenifer Yanez wrote this note acting as a scribe for Dr. Jose Manuel Hope on Jan 16, 2023.

## 2023-01-16 NOTE — END OF VISIT
[FreeTextEntry3] : All medical record entries made by the Scribe were at my,  Dr. Jose Manuel Hope MD., direction and personally dictated by me on 01/16/2023. I have personally reviewed the chart and agree that the record accurately reflects my personal performance of the history, physical exam, assessment and plan.

## 2023-01-16 NOTE — DISCUSSION/SUMMARY
[de-identified] : The underlying pathophysiology was reviewed with the patient. XR films were reviewed with the patient. Discussed at length the nature of the patient’s condition. \par \par At this time, given the nature of the injury as documented above, I recommended operative management of ORIF.\par The patient wishes to proceed with ORIF of right fourth and fifth CMC joints at this time. The risks and benefits were reviewed with the patient. All of his questions were answered. He will meet with our surgical scheduler.

## 2023-01-18 ENCOUNTER — OUTPATIENT (OUTPATIENT)
Dept: OUTPATIENT SERVICES | Facility: HOSPITAL | Age: 28
LOS: 1 days | End: 2023-01-18
Payer: MEDICAID

## 2023-01-18 ENCOUNTER — NON-APPOINTMENT (OUTPATIENT)
Age: 28
End: 2023-01-18

## 2023-01-18 ENCOUNTER — APPOINTMENT (OUTPATIENT)
Dept: ORTHOPEDIC SURGERY | Facility: CLINIC | Age: 28
End: 2023-01-18

## 2023-01-18 VITALS
HEIGHT: 69 IN | OXYGEN SATURATION: 98 % | RESPIRATION RATE: 14 BRPM | WEIGHT: 237 LBS | HEART RATE: 98 BPM | DIASTOLIC BLOOD PRESSURE: 96 MMHG | TEMPERATURE: 98 F | SYSTOLIC BLOOD PRESSURE: 153 MMHG

## 2023-01-18 DIAGNOSIS — Z01.818 ENCOUNTER FOR OTHER PREPROCEDURAL EXAMINATION: ICD-10-CM

## 2023-01-18 DIAGNOSIS — S63.054A DISLOCATION OF OTHER CARPOMETACARPAL JOINT OF RIGHT HAND, INITIAL ENCOUNTER: ICD-10-CM

## 2023-01-18 DIAGNOSIS — Z90.49 ACQUIRED ABSENCE OF OTHER SPECIFIED PARTS OF DIGESTIVE TRACT: Chronic | ICD-10-CM

## 2023-01-18 DIAGNOSIS — Z98.890 OTHER SPECIFIED POSTPROCEDURAL STATES: Chronic | ICD-10-CM

## 2023-01-18 LAB
ANION GAP SERPL CALC-SCNC: 8 MMOL/L — SIGNIFICANT CHANGE UP (ref 5–17)
BUN SERPL-MCNC: 17 MG/DL — SIGNIFICANT CHANGE UP (ref 7–23)
CALCIUM SERPL-MCNC: 9.1 MG/DL — SIGNIFICANT CHANGE UP (ref 8.4–10.5)
CHLORIDE SERPL-SCNC: 101 MMOL/L — SIGNIFICANT CHANGE UP (ref 96–108)
CO2 SERPL-SCNC: 28 MMOL/L — SIGNIFICANT CHANGE UP (ref 22–31)
CREAT SERPL-MCNC: 0.76 MG/DL — SIGNIFICANT CHANGE UP (ref 0.5–1.3)
EGFR: 126 ML/MIN/1.73M2 — SIGNIFICANT CHANGE UP
GLUCOSE SERPL-MCNC: 234 MG/DL — HIGH (ref 70–99)
HCT VFR BLD CALC: 43.9 % — SIGNIFICANT CHANGE UP (ref 39–50)
HGB BLD-MCNC: 14.7 G/DL — SIGNIFICANT CHANGE UP (ref 13–17)
MCHC RBC-ENTMCNC: 26.6 PG — LOW (ref 27–34)
MCHC RBC-ENTMCNC: 33.5 GM/DL — SIGNIFICANT CHANGE UP (ref 32–36)
MCV RBC AUTO: 79.5 FL — LOW (ref 80–100)
NRBC # BLD: 0 /100 WBCS — SIGNIFICANT CHANGE UP (ref 0–0)
PLATELET # BLD AUTO: 265 K/UL — SIGNIFICANT CHANGE UP (ref 150–400)
POTASSIUM SERPL-MCNC: 4 MMOL/L — SIGNIFICANT CHANGE UP (ref 3.5–5.3)
POTASSIUM SERPL-SCNC: 4 MMOL/L — SIGNIFICANT CHANGE UP (ref 3.5–5.3)
RBC # BLD: 5.52 M/UL — SIGNIFICANT CHANGE UP (ref 4.2–5.8)
RBC # FLD: 16 % — HIGH (ref 10.3–14.5)
SARS-COV-2 RNA SPEC QL NAA+PROBE: SIGNIFICANT CHANGE UP
SODIUM SERPL-SCNC: 137 MMOL/L — SIGNIFICANT CHANGE UP (ref 135–145)
WBC # BLD: 9.86 K/UL — SIGNIFICANT CHANGE UP (ref 3.8–10.5)
WBC # FLD AUTO: 9.86 K/UL — SIGNIFICANT CHANGE UP (ref 3.8–10.5)

## 2023-01-18 PROCEDURE — 80048 BASIC METABOLIC PNL TOTAL CA: CPT

## 2023-01-18 PROCEDURE — 36415 COLL VENOUS BLD VENIPUNCTURE: CPT

## 2023-01-18 PROCEDURE — G0463: CPT

## 2023-01-18 PROCEDURE — U0005: CPT

## 2023-01-18 PROCEDURE — 85027 COMPLETE CBC AUTOMATED: CPT

## 2023-01-18 PROCEDURE — U0003: CPT

## 2023-01-18 RX ORDER — OXYCODONE HYDROCHLORIDE 5 MG/1
1 TABLET ORAL
Qty: 0 | Refills: 0 | DISCHARGE

## 2023-01-18 NOTE — H&P PST ADULT - HISTORY OF PRESENT ILLNESS
28 y/o male with hx of colon cancer, s/p resection-8/15/22 on chemotherapy with right 4th and 5th metacarpal injuryon 12/31/22  scheduled for ORIF. Pain with pressure and weight bearing. Today for PST in NAD.

## 2023-01-18 NOTE — H&P PST ADULT - ASSESSMENT
28 y/o male with right 4tha and 5th metacarpal dislocation  Planned surgery.  oncology clearance donee  Pre op instructions provided  Instructions provided  wash, covid testing -today

## 2023-01-18 NOTE — H&P PST ADULT - NSICDXPASTMEDICALHX_GEN_ALL_CORE_FT
PAST MEDICAL HISTORY:  Acne     Ankle injury ' 2016  Right    Injury of metacarpal bone     Malignant neoplasm of rectosigmoid junction     Peritonsillar abscess

## 2023-01-19 ENCOUNTER — TRANSCRIPTION ENCOUNTER (OUTPATIENT)
Age: 28
End: 2023-01-19

## 2023-01-19 LAB
A1C WITH ESTIMATED AVERAGE GLUCOSE RESULT: 7.3 % — HIGH (ref 4–5.6)
ESTIMATED AVERAGE GLUCOSE: 163 MG/DL — HIGH (ref 68–114)

## 2023-01-20 ENCOUNTER — TRANSCRIPTION ENCOUNTER (OUTPATIENT)
Age: 28
End: 2023-01-20

## 2023-01-20 ENCOUNTER — APPOINTMENT (OUTPATIENT)
Dept: ORTHOPEDIC SURGERY | Facility: HOSPITAL | Age: 28
End: 2023-01-20

## 2023-01-20 ENCOUNTER — OUTPATIENT (OUTPATIENT)
Dept: OUTPATIENT SERVICES | Facility: HOSPITAL | Age: 28
LOS: 1 days | End: 2023-01-20
Payer: MEDICAID

## 2023-01-20 VITALS
SYSTOLIC BLOOD PRESSURE: 119 MMHG | DIASTOLIC BLOOD PRESSURE: 77 MMHG | TEMPERATURE: 98 F | OXYGEN SATURATION: 100 % | RESPIRATION RATE: 18 BRPM | HEART RATE: 87 BPM | HEIGHT: 69 IN | WEIGHT: 235.23 LBS

## 2023-01-20 VITALS — DIASTOLIC BLOOD PRESSURE: 73 MMHG | SYSTOLIC BLOOD PRESSURE: 123 MMHG | RESPIRATION RATE: 19 BRPM

## 2023-01-20 DIAGNOSIS — S63.054A DISLOCATION OF OTHER CARPOMETACARPAL JOINT OF RIGHT HAND, INITIAL ENCOUNTER: ICD-10-CM

## 2023-01-20 DIAGNOSIS — Z98.890 OTHER SPECIFIED POSTPROCEDURAL STATES: Chronic | ICD-10-CM

## 2023-01-20 DIAGNOSIS — Z90.49 ACQUIRED ABSENCE OF OTHER SPECIFIED PARTS OF DIGESTIVE TRACT: Chronic | ICD-10-CM

## 2023-01-20 DIAGNOSIS — Z01.818 ENCOUNTER FOR OTHER PREPROCEDURAL EXAMINATION: ICD-10-CM

## 2023-01-20 LAB — GLUCOSE BLDC GLUCOMTR-MCNC: 160 MG/DL — HIGH (ref 70–99)

## 2023-01-20 PROCEDURE — 26615 TREAT METACARPAL FRACTURE: CPT | Mod: RT

## 2023-01-20 PROCEDURE — ZZZZZ: CPT

## 2023-01-20 PROCEDURE — 76000 FLUOROSCOPY <1 HR PHYS/QHP: CPT

## 2023-01-20 PROCEDURE — 82962 GLUCOSE BLOOD TEST: CPT

## 2023-01-20 PROCEDURE — 26686 TREAT HAND DISLOCATION: CPT | Mod: RT

## 2023-01-20 PROCEDURE — C1713: CPT

## 2023-01-20 DEVICE — K-WIRE SYNTHES TROCAR POINT 1.25MM X 150MM: Type: IMPLANTABLE DEVICE | Site: RIGHT | Status: FUNCTIONAL

## 2023-01-20 RX ORDER — APREPITANT 80 MG/1
40 CAPSULE ORAL ONCE
Refills: 0 | Status: COMPLETED | OUTPATIENT
Start: 2023-01-20 | End: 2023-01-20

## 2023-01-20 RX ORDER — HYDROMORPHONE HYDROCHLORIDE 2 MG/ML
1 INJECTION INTRAMUSCULAR; INTRAVENOUS; SUBCUTANEOUS
Refills: 0 | Status: DISCONTINUED | OUTPATIENT
Start: 2023-01-20 | End: 2023-01-20

## 2023-01-20 RX ORDER — OXYCODONE HYDROCHLORIDE 5 MG/1
5 TABLET ORAL ONCE
Refills: 0 | Status: DISCONTINUED | OUTPATIENT
Start: 2023-01-20 | End: 2023-01-20

## 2023-01-20 RX ORDER — HYDROMORPHONE HYDROCHLORIDE 2 MG/ML
0.5 INJECTION INTRAMUSCULAR; INTRAVENOUS; SUBCUTANEOUS
Refills: 0 | Status: DISCONTINUED | OUTPATIENT
Start: 2023-01-20 | End: 2023-01-20

## 2023-01-20 RX ORDER — CEFAZOLIN SODIUM 1 G
2000 VIAL (EA) INJECTION ONCE
Refills: 0 | Status: COMPLETED | OUTPATIENT
Start: 2023-01-20 | End: 2023-01-20

## 2023-01-20 RX ORDER — CHLORHEXIDINE GLUCONATE 213 G/1000ML
1 SOLUTION TOPICAL ONCE
Refills: 0 | Status: COMPLETED | OUTPATIENT
Start: 2023-01-20 | End: 2023-01-20

## 2023-01-20 RX ORDER — IBUPROFEN 200 MG
1 TABLET ORAL
Qty: 30 | Refills: 0
Start: 2023-01-20

## 2023-01-20 RX ORDER — OXYCODONE AND ACETAMINOPHEN 5; 325 MG/1; MG/1
1 TABLET ORAL
Qty: 10 | Refills: 0
Start: 2023-01-20

## 2023-01-20 RX ORDER — SODIUM CHLORIDE 9 MG/ML
1000 INJECTION, SOLUTION INTRAVENOUS
Refills: 0 | Status: DISCONTINUED | OUTPATIENT
Start: 2023-01-20 | End: 2023-01-20

## 2023-01-20 RX ORDER — ONDANSETRON 8 MG/1
4 TABLET, FILM COATED ORAL ONCE
Refills: 0 | Status: DISCONTINUED | OUTPATIENT
Start: 2023-01-20 | End: 2023-01-20

## 2023-01-20 RX ORDER — ACETAMINOPHEN 500 MG
1000 TABLET ORAL ONCE
Refills: 0 | Status: COMPLETED | OUTPATIENT
Start: 2023-01-20 | End: 2023-01-20

## 2023-01-20 RX ADMIN — CHLORHEXIDINE GLUCONATE 1 APPLICATION(S): 213 SOLUTION TOPICAL at 09:34

## 2023-01-20 RX ADMIN — APREPITANT 40 MILLIGRAM(S): 80 CAPSULE ORAL at 09:34

## 2023-01-20 RX ADMIN — HYDROMORPHONE HYDROCHLORIDE 1 MILLIGRAM(S): 2 INJECTION INTRAMUSCULAR; INTRAVENOUS; SUBCUTANEOUS at 11:58

## 2023-01-20 RX ADMIN — HYDROMORPHONE HYDROCHLORIDE 1 MILLIGRAM(S): 2 INJECTION INTRAMUSCULAR; INTRAVENOUS; SUBCUTANEOUS at 12:15

## 2023-01-20 NOTE — ASU PATIENT PROFILE, ADULT - NSALCOHOLPROBLEMSRELYN_GEN_A_CORE_SD
no Detail Level: Detailed Size Of Lesion In Cm (Optional): 1.5 X Size Of Lesion In Cm (Optional): 0 Morphology Per Location (Optional): Present x ~9 months, stable since first noticed. No symptoms. Discussed benign features. Monitor closely with f/u in 6 months to recheck site due to recent onset. Pt declines excision at this time. Reconsider in future

## 2023-01-20 NOTE — ASU DISCHARGE PLAN (ADULT/PEDIATRIC) - CARE PROVIDER_API CALL
Jose Manuel Hope)  Orthopaedic Surgery  [address]  [place] [address]  Phone: [phone]  Fax: [phone]  Follow Up Time:

## 2023-01-20 NOTE — BRIEF OPERATIVE NOTE - NSICDXBRIEFPROCEDURE_GEN_ALL_CORE_FT
PROCEDURES:  Open reduction and internal fixation (ORIF) of dislocation of carpometacarpal (CMC) joint 20-Jan-2023 10:26:08  Jakub Felix

## 2023-01-20 NOTE — BRIEF OPERATIVE NOTE - NSICDXBRIEFPOSTOP_GEN_ALL_CORE_FT
POST-OP DIAGNOSIS:  Dislocation of carpometacarpal joint of right hand, initial encounter 20-Jan-2023 10:25:12  Jakub Felix

## 2023-01-20 NOTE — ASU DISCHARGE PLAN (ADULT/PEDIATRIC) - NS MD DC FALL RISK RISK
For information on Fall & Injury Prevention, visit: [email or web address] OR  [email or web address] OR  https://www.cdc.gov/steadi/patient.html

## 2023-01-20 NOTE — ASU PATIENT PROFILE, ADULT - WILL THE PATIENT ACCEPT THE PFIZER COVID-19 VACCINE IF ELIGIBLE AND IT IS AVAILABLE?
Pt called saw Team yesterday, states provider told pt to take FOUR times daily Keflex for 10 days     Pt picked up Rx and it had BID instructions for 40 tablets     Dx Cellulitis on leg that are draining     Patient called to clarify - should she be taking this QID or BID?       cephALEXin (KEFLEX) 500 MG capsule 40 capsule 0 6/17/2021  --   Sig - Route: Take 1 capsule (500 mg) by mouth 2 times daily - Oral   Sent to pharmacy as: Cephalexin 500 MG Oral Capsule (KEFLEX)   Class: E-Prescribe   Order: [id number]   E-Prescribing Status: Receipt confirmed by pharmacy (6/17/2021  4:51 PM CDT)   Printout Tracking    External Result Report   Pharmacy    [id number] [place], [place] - [address] AT [address] [address] & [address]     Call back [phone] detailed message is sarmad ROSE RN    
Pt informed of frequency. States she received a call back from TITA Santoro. Encounter will be closed.   
Should be four times daily    Shola Benoit MD, MD   
No

## 2023-01-20 NOTE — BRIEF OPERATIVE NOTE - NSICDXBRIEFPREOP_GEN_ALL_CORE_FT
PRE-OP DIAGNOSIS:  Dislocation of carpometacarpal joint of right hand 20-Jan-2023 10:25:40  Jakub Felix

## 2023-01-26 PROBLEM — T14.90XA INJURY, UNSPECIFIED, INITIAL ENCOUNTER: Chronic | Status: ACTIVE | Noted: 2023-01-18

## 2023-01-30 ENCOUNTER — APPOINTMENT (OUTPATIENT)
Dept: ORTHOPEDIC SURGERY | Facility: CLINIC | Age: 28
End: 2023-01-30
Payer: MEDICAID

## 2023-01-30 PROCEDURE — 99024 POSTOP FOLLOW-UP VISIT: CPT

## 2023-01-30 PROCEDURE — 29075 APPL CST ELBW FNGR SHORT ARM: CPT | Mod: 58,RT

## 2023-01-30 PROCEDURE — 73130 X-RAY EXAM OF HAND: CPT | Mod: RT

## 2023-02-27 ENCOUNTER — APPOINTMENT (OUTPATIENT)
Dept: ORTHOPEDIC SURGERY | Facility: CLINIC | Age: 28
End: 2023-02-27
Payer: MEDICAID

## 2023-02-27 DIAGNOSIS — S63.054A DISLOCATION OF OTHER CARPOMETACARPAL JOINT OF RIGHT HAND, INITIAL ENCOUNTER: ICD-10-CM

## 2023-02-27 DIAGNOSIS — S62.314A DISPLACED FRACTURE OF BASE OF FOURTH METACARPAL BONE, RIGHT HAND, INITIAL ENCOUNTER FOR CLOSED FRACTURE: ICD-10-CM

## 2023-02-27 PROCEDURE — 99024 POSTOP FOLLOW-UP VISIT: CPT

## 2023-02-27 PROCEDURE — 20670 REMOVAL IMPLANT SUPERFICIAL: CPT | Mod: 58

## 2023-02-27 PROCEDURE — 73130 X-RAY EXAM OF HAND: CPT | Mod: RT

## 2023-02-27 NOTE — HISTORY OF PRESENT ILLNESS
[de-identified] : s/p Open reduction, internal fixation of right fourth and fifth metacarpal base fracture dislocation and application of short arm splint. [de-identified] : The patient is a 27 year male who returns for the 2nd postoperative visit after undergoing Open reduction, internal fixation of right fourth and fifth metacarpal base fracture dislocation and application of short arm splint at [facility]. The surgery was on 01/20/2023. He returns today and is doing well overall. He presents for repeat xrays, cast removal and K-wire removal. [de-identified] : Patient is WDWN, alert, and in no acute distress. Breathing is unlabored. He is grossly oriented to person, place, and time.\par \par He presents in a short arm cast, which was removed in the office today on 2/27/23. \par Once the cast was removed, no evidence of skin lesions or breakdown.\par Incision sites are well healed, without signs of postoperative infection.\par K-wire entry points appear to be clean, without signs of infection. The K-wires were removed without incident in the office today on 2/27/23. \par Limitations of digital ROM status post pinning and casting. [de-identified] : AP, lateral and oblique views of the RIGHT hand were obtained today and revealed the fourth and fifth CMC joints to be in a reduce position, stabilized by 1.25 K-wires x 2. (K-wires were removed after the xrays were obtained). [de-identified] : At this time, the right short arm cast was removed in the office today on 2/27/23. The K-wires were then removed without incident. He was instructed on local wound care and when to begin to get the hand wet as well as when to begin to use Vitamin E oil on the scars. I recommended he begin gentle flexion and extension exercises of the digits and to use the hand for all ADLs as tolerated.\par Follow up in 4 weeks for repeat xrays.

## 2023-02-27 NOTE — END OF VISIT
[FreeTextEntry3] : All medical record entries made by the Scribe were at my,  Dr. Jose Manuel Hope MD., direction and personally dictated by me on 02/27/2023. I have personally reviewed the chart and agree that the record accurately reflects my personal performance of the history, physical exam, assessment and plan.

## 2023-02-27 NOTE — ADDENDUM
[FreeTextEntry1] : I, Yenifer Yanez wrote this note acting as a scribe for Dr. Jose Manuel Hope on Feb 27, 2023.

## 2023-03-14 ENCOUNTER — TRANSCRIPTION ENCOUNTER (OUTPATIENT)
Age: 28
End: 2023-03-14

## 2023-03-27 ENCOUNTER — APPOINTMENT (OUTPATIENT)
Dept: ORTHOPEDIC SURGERY | Facility: CLINIC | Age: 28
End: 2023-03-27

## 2023-03-27 DIAGNOSIS — S62.91XA UNSPECIFIED FRACTURE OF RIGHT WRIST AND HAND, INITIAL ENCOUNTER FOR CLOSED FRACTURE: ICD-10-CM

## 2023-05-04 NOTE — H&P PST ADULT - LYMPHATIC
No cervical or clavicular lymphadenopathy noted Tranexamic Acid Pregnancy And Lactation Text: It is unknown if this medication is safe during pregnancy or breast feeding.

## 2023-06-14 NOTE — DISCHARGE NOTE PROVIDER - CARE PROVIDER_API CALL
Boris Stark)  ColonRectal Surgery; Surgery  [address]  [place] [address]  Phone: [phone]  Fax: [phone]  Follow Up Time: 1 week   Female

## 2023-08-01 ENCOUNTER — APPOINTMENT (OUTPATIENT)
Dept: ORTHOPEDIC SURGERY | Facility: CLINIC | Age: 28
End: 2023-08-01
Payer: MEDICAID

## 2023-08-01 ENCOUNTER — APPOINTMENT (OUTPATIENT)
Dept: ORTHOPEDIC SURGERY | Facility: CLINIC | Age: 28
End: 2023-08-01

## 2023-08-01 VITALS — BODY MASS INDEX: 35.55 KG/M2 | WEIGHT: 240 LBS | HEIGHT: 69 IN

## 2023-08-01 DIAGNOSIS — Z83.3 FAMILY HISTORY OF DIABETES MELLITUS: ICD-10-CM

## 2023-08-01 DIAGNOSIS — Z85.038 PERSONAL HISTORY OF OTHER MALIGNANT NEOPLASM OF LARGE INTESTINE: ICD-10-CM

## 2023-08-01 DIAGNOSIS — M25.569 PAIN IN UNSPECIFIED KNEE: ICD-10-CM

## 2023-08-01 DIAGNOSIS — S83.519A SPRAIN OF ANTERIOR CRUCIATE LIGAMENT OF UNSPECIFIED KNEE, INITIAL ENCOUNTER: ICD-10-CM

## 2023-08-01 PROCEDURE — 99214 OFFICE O/P EST MOD 30 MIN: CPT | Mod: 25

## 2023-08-01 PROCEDURE — 73564 X-RAY EXAM KNEE 4 OR MORE: CPT | Mod: LT

## 2023-08-01 NOTE — HISTORY OF PRESENT ILLNESS
[de-identified] : 27y active M presenting with an acute twisting injury of his L knee.  He was playing soccer with friends this weekend and sustained a noncontact injury to his L knee.  He felt a pop and fell to the ground and noted immediate pain and swelling.  He went to urgentcare and was placed in a brace and referred to me for further evaluation.  He notes heaviness and a large amount of swelling in his L knee compared to his R.  He feels some instability but notes the pain has decreased from a 10/10 to a 6/10 since the weekend.  He has no numbness/paresthesias.   Of note, he was diagnosed with stage III colon cancer and treated with surgery and chemotherapy last year.  He is in remission but feels he is weaker than before treatment and more prone to musculoskeletal injuries due to this weakness.  For 2023, he has been going to the gym consistently, running, and plays soccer most weekends with friends.

## 2023-08-01 NOTE — DISCUSSION/SUMMARY
[de-identified] : 27yM pw likely L ACL rupture after noncontact soccer injury.  The patient was extensively counseled on treatment options including but not limited to observation, rest/activity modification, bracing, anti-inflammatory medications, physical therapy, injections, and surgery.  The natural history of the disease was thoroughly explained.   He would like to avoid an aspiration of his knee despite the large effusion. The patient will proceed with: -MRI L knee -PT - prehab -knee immobilizer -meloxicam rx provided - pt instructed to take with meals and not with other nsaid's including advil, aleve, and toradol.  The pt understands to stop taking the medication if any stomach sx develop or they develop any type of bleeding or bruising, at which point they will present to their PMD -pt was instructed on the importance of resting, icing and elevating to minimize swelling -RTC after MRI  I have personally obtained the history, reviewed the ROS as noted, and performed the physical examination today.  The patient and I discussed the assessment and options and developed the plan.  All questions were answered and the patient stated their understanding of the treatment plan and appreciation of the visit.  My cumulative time spent on this patient's visit included: Preparation for the visit, review of the medical records, review of pertinent diagnostic studies, examination and counseling of the patient on the above diagnosis, treatment plan and prognosis, orders of diagnostic tests, medications and/or appropriate procedures and documentation in the medical records of today's visit.   Anurag Arevalo MD

## 2023-08-01 NOTE — PHYSICAL EXAM
[de-identified] : Gen: NAD Resp: Nonlabored respirations, no SOB Gait: antalgic  Knee: Skin intact large effusion 5-100 AROM Tender MJL + LJL Firing IP Q EHL TA GS SILT L3-S1 2+ dp bcr  Significant guarding with ligamentous exam: 2B lachman and (+) pivot shift Grade 1 posterior drawer Stable to v/v at 0 and 30 degrees (-) Dial test at 30, 90 degrees  (+) Aldair, unable to perform Thessaly  1+ patellar translation (-) pain with patellar compression/grind (-) J sign (-) apprehension  [de-identified] : The following radiographs were ordered and read by me during this patient's visit. I reviewed each radiograph in detail with the patient and discussed the findings as highlighted below.   4 views of the L knee were obtained today that show no fracture, or dislocation. There are no degenerative changes seen. There is no malalignment. No obvious osseous abnormality. Otherwise unremarkable.

## 2023-08-04 ENCOUNTER — APPOINTMENT (OUTPATIENT)
Dept: MRI IMAGING | Facility: CLINIC | Age: 28
End: 2023-08-04
Payer: MEDICAID

## 2023-08-04 ENCOUNTER — OUTPATIENT (OUTPATIENT)
Dept: OUTPATIENT SERVICES | Facility: HOSPITAL | Age: 28
LOS: 1 days | End: 2023-08-04
Payer: MEDICAID

## 2023-08-04 ENCOUNTER — APPOINTMENT (OUTPATIENT)
Dept: ORTHOPEDIC SURGERY | Facility: CLINIC | Age: 28
End: 2023-08-04
Payer: MEDICAID

## 2023-08-04 DIAGNOSIS — Z98.890 OTHER SPECIFIED POSTPROCEDURAL STATES: Chronic | ICD-10-CM

## 2023-08-04 DIAGNOSIS — M25.569 PAIN IN UNSPECIFIED KNEE: ICD-10-CM

## 2023-08-04 DIAGNOSIS — S82.143A DISPLACED BICONDYLAR FRACTURE OF UNSPECIFIED TIBIA, INITIAL ENCOUNTER FOR CLOSED FRACTURE: ICD-10-CM

## 2023-08-04 PROCEDURE — 73721 MRI JNT OF LWR EXTRE W/O DYE: CPT

## 2023-08-04 PROCEDURE — 99442: CPT

## 2023-08-04 PROCEDURE — 73721 MRI JNT OF LWR EXTRE W/O DYE: CPT | Mod: 26,LT

## 2023-08-04 NOTE — DISCUSSION/SUMMARY
[de-identified] : 27yM pw nondisplaced medial tibial plateau fracture after noncontact soccer injury.  The patient was extensively counseled on treatment options including but not limited to observation, rest/activity modification, bracing, anti-inflammatory medications, physical therapy, injections, and surgery.  The natural history of the disease was thoroughly explained.   We discussed that while surgery is often indicated in medial tibial plateau fractures, he has zero displacement.  We will closely monitor his injury with serial xrays and proceed with surgery if there is any interval displacement.  He will present to the ER with any compartment syndrome s/s. The patient will proceed with: -NWB in hinged knee brace -PT - prehab -meloxicam rx provided - pt instructed to take with meals and not with other nsaid's including advil, aleve, and toradol.  The pt understands to stop taking the medication if any stomach sx develop or they develop any type of bleeding or bruising, at which point they will present to their PMD -pt was instructed on the importance of resting, icing and elevating to minimize swelling -RTC 2w for repeat ap/lat knee xr  I have personally obtained the history, reviewed the ROS as noted, and performed the physical examination today.  The patient and I discussed the assessment and options and developed the plan.  All questions were answered and the patient stated their understanding of the treatment plan and appreciation of the visit.  My cumulative time spent on this patient's visit included: Preparation for the visit, review of the medical records, review of pertinent diagnostic studies, examination and counseling of the patient on the above diagnosis, treatment plan and prognosis, orders of diagnostic tests, medications and/or appropriate procedures and documentation in the medical records of today's visit.   Anurag Arevalo MD

## 2023-08-04 NOTE — HISTORY OF PRESENT ILLNESS
[de-identified] : 27y active M presenting with an acute twisting injury of his L knee.  He was playing soccer with friends this weekend and sustained a noncontact injury to his L knee.  He felt a pop and fell to the ground and noted immediate pain and swelling.  He went to urgentcare and was placed in a brace and referred to me for further evaluation.  He notes heaviness and a large amount of swelling in his L knee compared to his R.  He feels some instability but notes the pain has decreased from a 10/10 to a 6/10 since the weekend.  He has no numbness/paresthesias.   Of note, he was diagnosed with stage III colon cancer and treated with surgery and chemotherapy last year.  He is in remission but feels he is weaker than before treatment and more prone to musculoskeletal injuries due to this weakness.  For 2023, he has been going to the gym consistently, running, and plays soccer most weekends with friends.  8/4 interval - MRI completed, maintained pain and swelling, no n/p

## 2023-08-04 NOTE — PHYSICAL EXAM
[de-identified] : Gen: NAD Resp: Nonlabored respirations, no SOB Gait: antalgic  Knee: Skin intact large effusion 5-100 AROM Tender MJL + LJL Firing IP Q EHL TA GS SILT L3-S1 2+ dp bcr  Significant guarding with ligamentous exam: 2B lachman and (+) pivot shift Grade 1 posterior drawer Stable to v/v at 0 and 30 degrees (-) Dial test at 30, 90 degrees  (+) Aldair, unable to perform Thessaly  1+ patellar translation (-) pain with patellar compression/grind (-) J sign (-) apprehension  [de-identified] : The following radiographs were ordered and read by me during this patient's visit. I reviewed each radiograph in detail with the patient and discussed the findings as highlighted below.   4 views of the L knee were obtained today that show no fracture, or dislocation. There are no degenerative changes seen. There is no malalignment. No obvious osseous abnormality. Otherwise unremarkable.  I independently reviewed and interpreted the MRI of the knee.  I discussed with the patient the MRI demonstrates a nondisplaced medial tibial plateau fracture.

## 2023-08-04 NOTE — REASON FOR VISIT
[Home] : at home, [unfilled] , at the time of the visit. [Medical Office: ([facility])___] : at the medical office located in  [Initial Visit] : an initial visit for [Knee Pain] : knee pain

## 2023-12-12 ENCOUNTER — APPOINTMENT (OUTPATIENT)
Dept: ENDOCRINOLOGY | Facility: CLINIC | Age: 28
End: 2023-12-12
Payer: MEDICAID

## 2023-12-12 VITALS
OXYGEN SATURATION: 98 % | HEIGHT: 69 IN | RESPIRATION RATE: 18 BRPM | DIASTOLIC BLOOD PRESSURE: 82 MMHG | SYSTOLIC BLOOD PRESSURE: 124 MMHG | TEMPERATURE: 98 F | WEIGHT: 234 LBS | BODY MASS INDEX: 34.66 KG/M2 | HEART RATE: 84 BPM

## 2023-12-12 DIAGNOSIS — E11.9 TYPE 2 DIABETES MELLITUS W/OUT COMPLICATIONS: ICD-10-CM

## 2023-12-12 LAB
GLUCOSE BLDC GLUCOMTR-MCNC: 139
HBA1C MFR BLD HPLC: 11.1

## 2023-12-12 PROCEDURE — 82962 GLUCOSE BLOOD TEST: CPT

## 2023-12-12 PROCEDURE — 99204 OFFICE O/P NEW MOD 45 MIN: CPT | Mod: 25

## 2023-12-12 PROCEDURE — 83036 HEMOGLOBIN GLYCOSYLATED A1C: CPT | Mod: QW

## 2023-12-12 RX ORDER — BLOOD-GLUCOSE SENSOR
EACH MISCELLANEOUS
Qty: 6 | Refills: 3 | Status: ACTIVE | COMMUNITY
Start: 2023-12-12 | End: 1900-01-01

## 2023-12-12 RX ORDER — MELOXICAM 15 MG/1
15 TABLET ORAL DAILY
Qty: 30 | Refills: 2 | Status: DISCONTINUED | COMMUNITY
Start: 2023-08-01 | End: 2023-12-12

## 2024-02-05 ENCOUNTER — APPOINTMENT (OUTPATIENT)
Dept: ENDOCRINOLOGY | Facility: CLINIC | Age: 29
End: 2024-02-05

## 2024-03-14 NOTE — PROGRESS NOTE ADULT - SUBJECTIVE AND OBJECTIVE BOX
Overnight events:  -None GENERAL SURGERY PROGRESS NOTE    STATUS POST:    s/p LAR d/t rectal cancer 8/15.    SUBJECTIVE  Pt reports he is doing well, tolerating PO,+/+, ambulating, pain is well controlled.    OVERNIGHT EVENTS:  NAEON.    10-point review of systems completed and negative except as noted above.      OBJECTIVE    MEDICATIONS  acetaminophen     Tablet .. 1000 milliGRAM(s) Oral every 6 hours  enoxaparin Injectable 40 milliGRAM(s) SubCutaneous every 24 hours  naloxone Injectable 0.1 milliGRAM(s) IV Push every 3 minutes PRN  oxyCODONE    IR 5 milliGRAM(s) Oral every 6 hours PRN  oxyCODONE    IR 10 milliGRAM(s) Oral every 6 hours PRN  pantoprazole    Tablet 40 milliGRAM(s) Oral before breakfast      PHYSICAL EXAM  T(C): 36.7 (08-20-22 @ 06:19), Max: 36.9 (08-20-22 @ 02:25)  HR: 89 (08-20-22 @ 06:19) (87 - 96)  BP: 128/76 (08-20-22 @ 06:19) (123/67 - 130/68)  RR: 18 (08-20-22 @ 06:19) (18 - 21)  SpO2: 99% (08-20-22 @ 06:19) (96% - 99%)    08-19-22 @ 07:01  -  08-20-22 @ 07:00  --------------------------------------------------------  IN: 660 mL / OUT: 320 mL / NET: 340 mL        General: Appears well, NAD  Neuro: AAOx3  CHEST: Clear to auscultation bilaterally  CV: Regular rate and rhythm  Abdomen: soft, appropriately tender to palpation, softly distended, no rebounding or guarding  Midline Incision C/D/I, no surrounding edema or erythema  MAICOL drain: patent and functioning well with SSF output  Extremities: Grossly symmetric    LABS                        13.9   9.46  )-----------( 397      ( 20 Aug 2022 06:57 )             43.3     08-20    138  |  101  |  7   ----------------------------<  99  3.8   |  26  |  0.76    Ca    9.1      20 Aug 2022 06:57  Phos  4.0     08-20  Mg     2.00     08-20            RADIOLOGY & ADDITIONAL STUDIES Atrial fibrillation

## 2024-11-21 NOTE — PRE-ANESTHESIA EVALUATION ADULT - NSATTENDATTESTRD_GEN_ALL_CORE
Patient: Jackie Jose    Procedure Summary       Date: 11/21/24 Room / Location: [facility] ROOM 25 / SURGERY SAME DAY [facility]    Anesthesia Start: 1133 Anesthesia Stop: 1455    Procedures:       LAPAROSCOPIC SUPRACERVICAL HYSTERECTOMY WITH MINI LAPAROTOMY, BILATERAL SALPINGECTOMY, LYSIS OF ADHESIONS (Abdomen)      MINILAPAROTOMY (Abdomen) Diagnosis: (MENORRHAGIA, DYSMENORRHEA, DYSPAREUNIA)    Surgeons: Arsen Yanez M.D. Responsible Provider: Nicole Lewis M.D.    Anesthesia Type: general ASA Status: 2            Final Anesthesia Type: general  Last vitals  BP   Blood Pressure: 110/54    Temp   36.3 °C (97.4 °F)    Pulse   82   Resp   14    SpO2   98 %      Anesthesia Post Evaluation    Patient location during evaluation: PACU  Patient participation: complete - patient participated  Level of consciousness: awake and alert    Airway patency: patent  Anesthetic complications: no  Cardiovascular status: hemodynamically stable  Respiratory status: acceptable  Hydration status: euvolemic    PONV: none          No notable events documented.     Nurse Pain Score: 5 (NPRS)           The patient has been re-examined and I agree with the above assessment or I updated with my findings.

## 2024-12-18 ENCOUNTER — INPATIENT (INPATIENT)
Facility: HOSPITAL | Age: 29
LOS: 0 days | Discharge: LEFT BEFORE DISCHARGE | End: 2024-12-19
Attending: SURGERY | Admitting: SURGERY
Payer: MEDICAID

## 2024-12-18 VITALS
SYSTOLIC BLOOD PRESSURE: 151 MMHG | HEART RATE: 104 BPM | DIASTOLIC BLOOD PRESSURE: 103 MMHG | TEMPERATURE: 98 F | OXYGEN SATURATION: 97 % | RESPIRATION RATE: 17 BRPM | WEIGHT: 238.98 LBS

## 2024-12-18 DIAGNOSIS — Z98.890 OTHER SPECIFIED POSTPROCEDURAL STATES: Chronic | ICD-10-CM

## 2024-12-18 DIAGNOSIS — K61.1 RECTAL ABSCESS: ICD-10-CM

## 2024-12-18 DIAGNOSIS — Z90.49 ACQUIRED ABSENCE OF OTHER SPECIFIED PARTS OF DIGESTIVE TRACT: Chronic | ICD-10-CM

## 2024-12-18 LAB
ALBUMIN SERPL ELPH-MCNC: 4.1 G/DL — SIGNIFICANT CHANGE UP (ref 3.3–5)
ALP SERPL-CCNC: 132 U/L — HIGH (ref 40–120)
ALT FLD-CCNC: 10 U/L — SIGNIFICANT CHANGE UP (ref 4–41)
ANION GAP SERPL CALC-SCNC: 10 MMOL/L — SIGNIFICANT CHANGE UP (ref 7–14)
APTT BLD: 31.8 SEC — SIGNIFICANT CHANGE UP (ref 24.5–35.6)
AST SERPL-CCNC: 11 U/L — SIGNIFICANT CHANGE UP (ref 4–40)
BASOPHILS # BLD AUTO: 0.03 K/UL — SIGNIFICANT CHANGE UP (ref 0–0.2)
BASOPHILS NFR BLD AUTO: 0.3 % — SIGNIFICANT CHANGE UP (ref 0–2)
BILIRUB SERPL-MCNC: 0.2 MG/DL — SIGNIFICANT CHANGE UP (ref 0.2–1.2)
BLD GP AB SCN SERPL QL: NEGATIVE — SIGNIFICANT CHANGE UP
BUN SERPL-MCNC: 9 MG/DL — SIGNIFICANT CHANGE UP (ref 7–23)
CALCIUM SERPL-MCNC: 9.2 MG/DL — SIGNIFICANT CHANGE UP (ref 8.4–10.5)
CHLORIDE SERPL-SCNC: 102 MMOL/L — SIGNIFICANT CHANGE UP (ref 98–107)
CO2 SERPL-SCNC: 26 MMOL/L — SIGNIFICANT CHANGE UP (ref 22–31)
CREAT SERPL-MCNC: 0.74 MG/DL — SIGNIFICANT CHANGE UP (ref 0.5–1.3)
EGFR: 126 ML/MIN/1.73M2 — SIGNIFICANT CHANGE UP
EOSINOPHIL # BLD AUTO: 0.12 K/UL — SIGNIFICANT CHANGE UP (ref 0–0.5)
EOSINOPHIL NFR BLD AUTO: 1.1 % — SIGNIFICANT CHANGE UP (ref 0–6)
GLUCOSE SERPL-MCNC: 104 MG/DL — HIGH (ref 70–99)
HCT VFR BLD CALC: 44.1 % — SIGNIFICANT CHANGE UP (ref 39–50)
HGB BLD-MCNC: 14.1 G/DL — SIGNIFICANT CHANGE UP (ref 13–17)
IANC: 7.61 K/UL — HIGH (ref 1.8–7.4)
IMM GRANULOCYTES NFR BLD AUTO: 0.4 % — SIGNIFICANT CHANGE UP (ref 0–0.9)
INR BLD: 1.02 RATIO — SIGNIFICANT CHANGE UP (ref 0.85–1.16)
LYMPHOCYTES # BLD AUTO: 2.2 K/UL — SIGNIFICANT CHANGE UP (ref 1–3.3)
LYMPHOCYTES # BLD AUTO: 20.5 % — SIGNIFICANT CHANGE UP (ref 13–44)
MCHC RBC-ENTMCNC: 25.9 PG — LOW (ref 27–34)
MCHC RBC-ENTMCNC: 32 G/DL — SIGNIFICANT CHANGE UP (ref 32–36)
MCV RBC AUTO: 80.9 FL — SIGNIFICANT CHANGE UP (ref 80–100)
MONOCYTES # BLD AUTO: 0.75 K/UL — SIGNIFICANT CHANGE UP (ref 0–0.9)
MONOCYTES NFR BLD AUTO: 7 % — SIGNIFICANT CHANGE UP (ref 2–14)
NEUTROPHILS # BLD AUTO: 7.61 K/UL — HIGH (ref 1.8–7.4)
NEUTROPHILS NFR BLD AUTO: 70.7 % — SIGNIFICANT CHANGE UP (ref 43–77)
NRBC # BLD: 0 /100 WBCS — SIGNIFICANT CHANGE UP (ref 0–0)
NRBC # FLD: 0 K/UL — SIGNIFICANT CHANGE UP (ref 0–0)
PLATELET # BLD AUTO: 332 K/UL — SIGNIFICANT CHANGE UP (ref 150–400)
POTASSIUM SERPL-MCNC: 3.9 MMOL/L — SIGNIFICANT CHANGE UP (ref 3.5–5.3)
POTASSIUM SERPL-SCNC: 3.9 MMOL/L — SIGNIFICANT CHANGE UP (ref 3.5–5.3)
PROT SERPL-MCNC: 7.7 G/DL — SIGNIFICANT CHANGE UP (ref 6–8.3)
PROTHROM AB SERPL-ACNC: 12.1 SEC — SIGNIFICANT CHANGE UP (ref 9.9–13.4)
RBC # BLD: 5.45 M/UL — SIGNIFICANT CHANGE UP (ref 4.2–5.8)
RBC # FLD: 13.2 % — SIGNIFICANT CHANGE UP (ref 10.3–14.5)
RH IG SCN BLD-IMP: POSITIVE — SIGNIFICANT CHANGE UP
SODIUM SERPL-SCNC: 138 MMOL/L — SIGNIFICANT CHANGE UP (ref 135–145)
WBC # BLD: 10.75 K/UL — HIGH (ref 3.8–10.5)
WBC # FLD AUTO: 10.75 K/UL — HIGH (ref 3.8–10.5)

## 2024-12-18 PROCEDURE — 99285 EMERGENCY DEPT VISIT HI MDM: CPT

## 2024-12-18 RX ORDER — METRONIDAZOLE 500 MG/1
500 TABLET ORAL ONCE
Refills: 0 | Status: COMPLETED | OUTPATIENT
Start: 2024-12-18 | End: 2024-12-18

## 2024-12-18 RX ORDER — METRONIDAZOLE 500 MG/1
500 TABLET ORAL EVERY 8 HOURS
Refills: 0 | Status: DISCONTINUED | OUTPATIENT
Start: 2024-12-19 | End: 2024-12-19

## 2024-12-18 RX ORDER — ACETAMINOPHEN 500MG 500 MG/1
1000 TABLET, COATED ORAL EVERY 6 HOURS
Refills: 0 | Status: COMPLETED | OUTPATIENT
Start: 2024-12-18 | End: 2024-12-19

## 2024-12-18 RX ORDER — CIPROFLOXACIN HCL 750 MG
400 TABLET ORAL ONCE
Refills: 0 | Status: COMPLETED | OUTPATIENT
Start: 2024-12-18 | End: 2024-12-18

## 2024-12-18 RX ORDER — CIPROFLOXACIN HCL 750 MG
TABLET ORAL
Refills: 0 | Status: DISCONTINUED | OUTPATIENT
Start: 2024-12-18 | End: 2024-12-19

## 2024-12-18 RX ORDER — METRONIDAZOLE 500 MG/1
TABLET ORAL
Refills: 0 | Status: DISCONTINUED | OUTPATIENT
Start: 2024-12-18 | End: 2024-12-19

## 2024-12-18 RX ORDER — CIPROFLOXACIN HCL 750 MG
400 TABLET ORAL EVERY 12 HOURS
Refills: 0 | Status: DISCONTINUED | OUTPATIENT
Start: 2024-12-19 | End: 2024-12-19

## 2024-12-18 RX ORDER — 0.9 % SODIUM CHLORIDE 0.9 %
1000 INTRAVENOUS SOLUTION INTRAVENOUS
Refills: 0 | Status: DISCONTINUED | OUTPATIENT
Start: 2024-12-18 | End: 2024-12-19

## 2024-12-18 RX ADMIN — Medication 200 MILLIGRAM(S): at 23:13

## 2024-12-18 RX ADMIN — Medication 120 MILLILITER(S): at 21:33

## 2024-12-18 RX ADMIN — METRONIDAZOLE 100 MILLIGRAM(S): 500 TABLET ORAL at 21:31

## 2024-12-18 NOTE — H&P ADULT - NSHPLABSRESULTS_GEN_ALL_CORE
Labs pending.    CT impression from outside facility:  "new phlegmon/developing abscess along left margin of colorectal anastomosis, which raises concern for the possibility of anastomotic leak."

## 2024-12-18 NOTE — H&P ADULT - ASSESSMENT
29M hx open LAR 8/15/22 pw outpatient CT cf anastomotic abscess.    Plan:  -Admit to A Team, Dr. Stark.  -NPO/IVF.  -Cipro/Flagyl.  -Attempt to upload CT to PACS.  -Chemical VTE ppx held, pending discussion in AM regarding potential IR consult.   -Analgesia prn.  -F/u labs.    Discussed with Dr. Soliz.    A Team Surgery  Please page [phone] for all questions.

## 2024-12-18 NOTE — ED PROVIDER NOTE - CARE PLAN
Rounding complete. Pt resting in bed, watching TV. Nose bleeding has subsided with pt back in bed. Pt denies any needs at this time.   Principal Discharge DX:	Rectal abscess   1

## 2024-12-18 NOTE — H&P ADULT - NSHPPHYSICALEXAM_GEN_ALL_CORE
General: not acutely distressed  Neurological: AO X4  Respiratory: nonlabored respirations  Cardiac: pulse present  Abdominal: soft, nontender, nondistended, no rebound, no guarding, no palpable mass, well-healed laparotomy scar, striae  Extremities: warm

## 2024-12-18 NOTE — ED ADULT NURSE NOTE - OBJECTIVE STATEMENT
Pt received to intake room 12 a&ox4, ambulatory sent in by GI for rectal abscess found on CT. Pmh - Colon CA, DM. On assessment pt well appearing. Respirations even and unlabored. Abdomen soft, nondistended, nontender to touch. Pt denies chest pain, shortness of breath, nausea, vomiting, fevers, chills, pain at this time. 20g IV placed to right AC. Labs collected and sent. Provider at bedside. Stretcher in lowest position, wheels locked, appropriate side rails in place, call bell in reach.

## 2024-12-18 NOTE — ED PROVIDER NOTE - OBJECTIVE STATEMENT
29M with rectosigmoid mass s/p rectosigmoid resection by Dr. Stark (2022) p/w possible abscess within rectal canal near surgical site per pt. Pt states he had severe lower abd pain, saw his GI doctor who ordered CT scan. CT scan revealed possible phlegmon vs abscess along anastomosis site per report provided by pt. Pt was advised to come to ED for surgical evaluation. Pt denies fever, chills. No pain at current. Surgery aware of pt, will come and evaluate.

## 2024-12-18 NOTE — ED PROVIDER NOTE - CLINICAL SUMMARY MEDICAL DECISION MAKING FREE TEXT BOX
29M with rectosigmoid mass s/p rectosigmoid resection by Dr. Stark (2022) p/w possible abscess within rectal canal near surgical site per pt. Pt states he had severe lower abd pain, saw his GI doctor who ordered CT scan. CT scan revealed possible phlegmon vs abscess along anastomosis site per report provided by pt. Pt was advised to come to ED for surgical evaluation. Pt denies fever, chills. No pain at current. Surgery aware of pt, will come and evaluate.     plan  - labs  - abx  - admit to surgery Dr. Stark

## 2024-12-18 NOTE — H&P ADULT - HISTORY OF PRESENT ILLNESS
29M hx DM, colon cancer s/p open LAR 8/15/22 s/p chemo (completed ~April '23), pw rectal pain. Patient reports rectal pain began Monday, describes pain as deep, sharp, waxing and waning. His GI ordered a CT for him, which showed: "new phlegmon/developing abscess along left margin of colorectal anastomosis, which raises concern for the possibility of anastomotic leak." Patient states he was then advised to present to ED. At present, denies any pain. Reports feeling better following the contrast he took earlier today. States he had a "GI bug" last week, but did not seek treatment for it and it has since self-resolved. Has been tolerating liquids, but reports has been avoiding solids somewhat d/t fear of worsening his rectal pain with BMs. Passing flatus and non-bloody stool. Denies recent fevers, chills, nausea, emesis.     In ED, tachy to 104. Labs pending.

## 2024-12-19 VITALS
DIASTOLIC BLOOD PRESSURE: 91 MMHG | TEMPERATURE: 98 F | OXYGEN SATURATION: 100 % | RESPIRATION RATE: 17 BRPM | SYSTOLIC BLOOD PRESSURE: 130 MMHG | HEART RATE: 81 BPM

## 2024-12-19 LAB
ALBUMIN SERPL ELPH-MCNC: 3.6 G/DL — SIGNIFICANT CHANGE UP (ref 3.3–5)
ALP SERPL-CCNC: 115 U/L — SIGNIFICANT CHANGE UP (ref 40–120)
ALT FLD-CCNC: 11 U/L — SIGNIFICANT CHANGE UP (ref 4–41)
ANION GAP SERPL CALC-SCNC: 13 MMOL/L — SIGNIFICANT CHANGE UP (ref 7–14)
AST SERPL-CCNC: 10 U/L — SIGNIFICANT CHANGE UP (ref 4–40)
BILIRUB DIRECT SERPL-MCNC: <0.2 MG/DL — SIGNIFICANT CHANGE UP (ref 0–0.3)
BILIRUB INDIRECT FLD-MCNC: >0.2 MG/DL — SIGNIFICANT CHANGE UP (ref 0–1)
BILIRUB SERPL-MCNC: 0.4 MG/DL — SIGNIFICANT CHANGE UP (ref 0.2–1.2)
BUN SERPL-MCNC: 8 MG/DL — SIGNIFICANT CHANGE UP (ref 7–23)
CALCIUM SERPL-MCNC: 8.7 MG/DL — SIGNIFICANT CHANGE UP (ref 8.4–10.5)
CHLORIDE SERPL-SCNC: 102 MMOL/L — SIGNIFICANT CHANGE UP (ref 98–107)
CO2 SERPL-SCNC: 23 MMOL/L — SIGNIFICANT CHANGE UP (ref 22–31)
CREAT SERPL-MCNC: 0.72 MG/DL — SIGNIFICANT CHANGE UP (ref 0.5–1.3)
EGFR: 127 ML/MIN/1.73M2 — SIGNIFICANT CHANGE UP
GLUCOSE BLDC GLUCOMTR-MCNC: 102 MG/DL — HIGH (ref 70–99)
GLUCOSE BLDC GLUCOMTR-MCNC: 104 MG/DL — HIGH (ref 70–99)
GLUCOSE BLDC GLUCOMTR-MCNC: 113 MG/DL — HIGH (ref 70–99)
GLUCOSE SERPL-MCNC: 110 MG/DL — HIGH (ref 70–99)
HCT VFR BLD CALC: 40.1 % — SIGNIFICANT CHANGE UP (ref 39–50)
HGB BLD-MCNC: 13.4 G/DL — SIGNIFICANT CHANGE UP (ref 13–17)
MAGNESIUM SERPL-MCNC: 2.2 MG/DL — SIGNIFICANT CHANGE UP (ref 1.6–2.6)
MCHC RBC-ENTMCNC: 26.4 PG — LOW (ref 27–34)
MCHC RBC-ENTMCNC: 33.4 G/DL — SIGNIFICANT CHANGE UP (ref 32–36)
MCV RBC AUTO: 78.9 FL — LOW (ref 80–100)
NRBC # BLD: 0 /100 WBCS — SIGNIFICANT CHANGE UP (ref 0–0)
NRBC # FLD: 0 K/UL — SIGNIFICANT CHANGE UP (ref 0–0)
PHOSPHATE SERPL-MCNC: 3.1 MG/DL — SIGNIFICANT CHANGE UP (ref 2.5–4.5)
PLATELET # BLD AUTO: 296 K/UL — SIGNIFICANT CHANGE UP (ref 150–400)
POTASSIUM SERPL-MCNC: 3.6 MMOL/L — SIGNIFICANT CHANGE UP (ref 3.5–5.3)
POTASSIUM SERPL-SCNC: 3.6 MMOL/L — SIGNIFICANT CHANGE UP (ref 3.5–5.3)
PROT SERPL-MCNC: 6.8 G/DL — SIGNIFICANT CHANGE UP (ref 6–8.3)
RBC # BLD: 5.08 M/UL — SIGNIFICANT CHANGE UP (ref 4.2–5.8)
RBC # FLD: 12.8 % — SIGNIFICANT CHANGE UP (ref 10.3–14.5)
SODIUM SERPL-SCNC: 138 MMOL/L — SIGNIFICANT CHANGE UP (ref 135–145)
WBC # BLD: 7.03 K/UL — SIGNIFICANT CHANGE UP (ref 3.8–10.5)
WBC # FLD AUTO: 7.03 K/UL — SIGNIFICANT CHANGE UP (ref 3.8–10.5)

## 2024-12-19 PROCEDURE — 74177 CT ABD & PELVIS W/CONTRAST: CPT | Mod: 26

## 2024-12-19 RX ORDER — 0.9 % SODIUM CHLORIDE 0.9 %
1000 INTRAVENOUS SOLUTION INTRAVENOUS
Refills: 0 | Status: DISCONTINUED | OUTPATIENT
Start: 2024-12-19 | End: 2024-12-19

## 2024-12-19 RX ORDER — ENOXAPARIN SODIUM 30 MG/.3ML
40 INJECTION SUBCUTANEOUS EVERY 24 HOURS
Refills: 0 | Status: DISCONTINUED | OUTPATIENT
Start: 2024-12-19 | End: 2024-12-19

## 2024-12-19 RX ORDER — GLUCAGON INJECTION, SOLUTION 0.5 MG/.1ML
1 INJECTION, SOLUTION SUBCUTANEOUS ONCE
Refills: 0 | Status: DISCONTINUED | OUTPATIENT
Start: 2024-12-19 | End: 2024-12-19

## 2024-12-19 RX ORDER — METRONIDAZOLE 500 MG/1
1 TABLET ORAL
Qty: 42 | Refills: 0
Start: 2024-12-19 | End: 2025-01-01

## 2024-12-19 RX ORDER — CIPROFLOXACIN HCL 750 MG
1 TABLET ORAL
Qty: 28 | Refills: 0
Start: 2024-12-19 | End: 2025-01-01

## 2024-12-19 RX ORDER — OXYCODONE HYDROCHLORIDE 30 MG/1
1 TABLET ORAL
Qty: 5 | Refills: 0
Start: 2024-12-19

## 2024-12-19 RX ADMIN — ACETAMINOPHEN 500MG 400 MILLIGRAM(S): 500 TABLET, COATED ORAL at 06:42

## 2024-12-19 RX ADMIN — Medication 200 MILLIGRAM(S): at 18:52

## 2024-12-19 RX ADMIN — ACETAMINOPHEN 500MG 1000 MILLIGRAM(S): 500 TABLET, COATED ORAL at 07:42

## 2024-12-19 RX ADMIN — ACETAMINOPHEN 500MG 400 MILLIGRAM(S): 500 TABLET, COATED ORAL at 18:52

## 2024-12-19 RX ADMIN — ACETAMINOPHEN 500MG 400 MILLIGRAM(S): 500 TABLET, COATED ORAL at 12:55

## 2024-12-19 RX ADMIN — METRONIDAZOLE 100 MILLIGRAM(S): 500 TABLET ORAL at 15:00

## 2024-12-19 RX ADMIN — METRONIDAZOLE 100 MILLIGRAM(S): 500 TABLET ORAL at 06:42

## 2024-12-19 RX ADMIN — ACETAMINOPHEN 500MG 400 MILLIGRAM(S): 500 TABLET, COATED ORAL at 00:25

## 2024-12-19 RX ADMIN — Medication 200 MILLIGRAM(S): at 12:55

## 2024-12-19 RX ADMIN — ACETAMINOPHEN 500MG 1000 MILLIGRAM(S): 500 TABLET, COATED ORAL at 19:52

## 2024-12-19 RX ADMIN — ACETAMINOPHEN 500MG 1000 MILLIGRAM(S): 500 TABLET, COATED ORAL at 13:55

## 2024-12-19 NOTE — DISCHARGE NOTE PROVIDER - HOSPITAL COURSE
29M hx DM, colon cancer s/p open LAR 8/15/22 s/p chemo (completed ~April '23), presenting with rectal pain. Patient reports rectal pain began Monday, describes pain as deep, sharp, waxing and waning. His GI ordered a CT for him, which showed: "new phlegmon/developing abscess along left margin of colorectal anastomosis, which raises concern for the possibility of anastomotic leak." Patient states he was then advised to present to ED. Patient was made NPO and IV abx started. Repeat CTAP with PO and IV contrast obtained which showed "Multiple outpouchings at the site of rectal anastomosis. No extravasation of oral contrast. The outpouching containing contrast may represent a   diverticulum or postsurgical change. Findings can be infectious with a possible intramural collection to the left of the anastomosis. Recurrence   of disease is not entirely excluded. Short-term follow-up exam is advised once patient's current clinical issues resolve.". Patient was advanced to clear liquids. However, patient left against medical advice on 12/19/24. Patient was discharged on PO antibiotics, pain regimen, and close follow up with Dr. Soliz.

## 2024-12-19 NOTE — DISCHARGE NOTE PROVIDER - NSDCMRMEDTOKEN_GEN_ALL_CORE_FT
ciprofloxacin 500 mg oral tablet: 1 tab(s) orally 2 times a day  metroNIDAZOLE 500 mg oral tablet: 1 tab(s) orally 3 times a day  oxyCODONE 5 mg oral tablet: 1 tab(s) orally every 6 hours as needed for  severe pain MDD: 4

## 2024-12-19 NOTE — PROGRESS NOTE ADULT - SUBJECTIVE AND OBJECTIVE BOX
24 Hour Events:  - NAOE    SUBJECTIVE: Pt seen at bedside during AM rounds. No o/n events, patient resting comfortably.    Vital Signs Last 24 Hrs  T(C): 37.1 (19 Dec 2024 09:39), Max: 37.1 (19 Dec 2024 09:39)  T(F): 98.7 (19 Dec 2024 09:39), Max: 98.7 (19 Dec 2024 09:39)  HR: 88 (19 Dec 2024 09:39) (61 - 104)  BP: 128/62 (19 Dec 2024 09:39) (122/59 - 151/103)  BP(mean): --  RR: 18 (19 Dec 2024 09:39) (17 - 18)  SpO2: 100% (19 Dec 2024 09:39) (97% - 100%)    Parameters below as of 19 Dec 2024 09:39  Patient On (Oxygen Delivery Method): room air        I&O's Summary      LABS:                        13.4   7.03  )-----------( 296      ( 19 Dec 2024 06:00 )             40.1     12-19    138  |  102  |  8   ----------------------------<  110[H]  3.6   |  23  |  0.72    Ca    8.7      19 Dec 2024 06:00  Phos  3.1     12-19  Mg     2.20     12-19    TPro  6.8  /  Alb  3.6  /  TBili  0.4  /  DBili  <0.2  /  AST  10  /  ALT  11  /  AlkPhos  115  12-19    PT/INR - ( 18 Dec 2024 21:54 )   PT: 12.1 sec;   INR: 1.02 ratio         PTT - ( 18 Dec 2024 21:54 )  PTT:31.8 sec  Urinalysis Basic - ( 19 Dec 2024 06:00 )    Color: x / Appearance: x / SG: x / pH: x  Gluc: 110 mg/dL / Ketone: x  / Bili: x / Urobili: x   Blood: x / Protein: x / Nitrite: x   Leuk Esterase: x / RBC: x / WBC x   Sq Epi: x / Non Sq Epi: x / Bacteria: x        Physical Exam:  General Appearance: Appears well, NAD  Neck: Supple  Chest: Equal expansion bilaterally, equal breath sounds  CV: Pulse regular presently  Abdomen: Soft, nontender  Extremities: Grossly symmetric, SCD's in place

## 2024-12-19 NOTE — PROGRESS NOTE ADULT - ASSESSMENT
29M hx open LAR 8/15/22 pw outpatient CT c/f anastomotic abscess from OSH.    Plan:  - NPO/IVF  - CT AP w/ PO and IV contrast this AM, f/u results  - continue IV abx, cipro/flagyl  -Chemical VTE ppx held, pending discussion in AM regarding potential IR consult.       [facility]  #[id number]

## 2024-12-19 NOTE — CONSULT NOTE ADULT - ASSESSMENT
CC:  Geoffrey G Gerhardt is here today for office visit, migraines (last 2 weeks. Sumatriptan is not working), and URI (for about 3 days sinus congestion and sore throat).     Medications: medications verified and updated  Refills needed today?  NO  Patient would like communication of their results via:    Cell Phone:   Telephone Information:   Mobile [phone]   .  Okay to leave a detailed message containing results? Yes  Advanced directives: No          Health Maintenance Due   Topic Date Due   • Annual Physical (ages 3-18)  Never done   • HPV Vaccine (1 - Male 2-dose series) Never done   • COVID-19 Vaccine (3 - Booster for Pfizer series) 10/30/2021   • Meningococcal Vaccine (2 - 2-dose series) 02/14/2022   • Depression Screening  10/06/2022     Patient is due for topics as listed above but is not proceeding with Immunization(s) COVID-19, HPV and Meningococcal, Annual Wellness Visit (ages 3-18) and Depression Screening  at this time. To speak to provider.      Hematology/Oncology consulted for patient who is under care of Dr. Rossi of [facility], s/p rectosigmoid resection by Dr. Stark (2022) p/w possible abscess within rectal canal near surgical site per pt. Pt states he had severe lower abd pain, saw his GI doctor who ordered CT scan. CT scan revealed possible phlegmon vs abscess along anastomosis site per report provided by pt. Pt was advised to come to ED for surgical evaluation. Pt denies fever, chills. No pain at current.      Rectal cancer  -- Large rectosigmoid mass noted during colonoscopy 08/03; MRI showed metastatic adenopathy   -- s/p LAR 08/15/22 by Dr Stark  -- patient to follow up with oncologist Dr. Rossi   --CT c/a/p 12/18/24 as outpt showed New phlegmon/developing abscess along left margin of the colorectal anastomosis for the possibility of anastomotic leak.  --As per Surgery, NPO for now, - continue IV abx, cipro/flagyl  -- CT AP w/ PO and IV contrast this AM, f/u result  -- pending discussion regarding potential IR consult.     will follow,     Cecile Fabian NP  Hematology/Oncology  [facility]  [phone] (office)  [phone] (alt office)  Evenings and weekends please call MD on call or office

## 2024-12-19 NOTE — PATIENT PROFILE ADULT - FALL HARM RISK - UNIVERSAL INTERVENTIONS
Bed in lowest position, wheels locked, appropriate side rails in place/Call bell, personal items and telephone in reach/Instruct patient to call for assistance before getting out of bed or chair/Non-slip footwear when patient is out of bed/[place] to call system/Physically safe environment - no spills, clutter or unnecessary equipment/Purposeful Proactive Rounding/Room/bathroom lighting operational, light cord in reach

## 2024-12-19 NOTE — CONSULT NOTE ADULT - SUBJECTIVE AND OBJECTIVE BOX
Reason for consult: Hx Colon Cancer    HPI:  29M hx DM, colon cancer s/p open LAR 8/15/22 s/p chemo (completed ~April '23), pw rectal pain. Patient reports rectal pain began Monday, describes pain as deep, sharp, waxing and waning. His GI ordered a CT for him, which showed: "new phlegmon/developing abscess along left margin of colorectal anastomosis, which raises concern for the possibility of anastomotic leak." Patient states he was then advised to present to ED. At present, denies any pain. Reports feeling better following the contrast he took earlier today. States he had a "GI bug" last week, but did not seek treatment for it and it has since self-resolved. Has been tolerating liquids, but reports has been avoiding solids somewhat d/t fear of worsening his rectal pain with BMs. Passing flatus and non-bloody stool. Denies recent fevers, chills, nausea, emesis.     In ED, tachy to 104. Labs pending. (18 Dec 2024 21:32)      PAST MEDICAL & SURGICAL HISTORY:  Acne      Ankle injury  ' 2016  Right      Peritonsillar abscess      Malignant neoplasm of rectosigmoid junction      Injury of metacarpal bone      History of ankle surgery  2017      S/P colon resection  8/15/22          FAMILY HISTORY:      Alochol: Denied  Smoking: Nonsmoker  Drug Use: Denied  Marital Status:         Allergies    No Known Allergies    Intolerances        MEDICATIONS  (STANDING):  acetaminophen   IVPB .. 1000 milliGRAM(s) IV Intermittent every 6 hours  ciprofloxacin   IVPB 400 milliGRAM(s) IV Intermittent every 12 hours  ciprofloxacin   IVPB      dextrose 5%. 1000 milliLiter(s) (50 mL/Hr) IV Continuous <Continuous>  dextrose 5%. 1000 milliLiter(s) (100 mL/Hr) IV Continuous <Continuous>  dextrose 50% Injectable 25 Gram(s) IV Push once  dextrose 50% Injectable 12.5 Gram(s) IV Push once  dextrose 50% Injectable 25 Gram(s) IV Push once  enoxaparin Injectable 40 milliGRAM(s) SubCutaneous every 24 hours  glucagon  Injectable 1 milliGRAM(s) IntraMuscular once  insulin lispro (ADMELOG) corrective regimen sliding scale   SubCutaneous every 6 hours  lactated ringers. 1000 milliLiter(s) (120 mL/Hr) IV Continuous <Continuous>  metroNIDAZOLE  IVPB      metroNIDAZOLE  IVPB 500 milliGRAM(s) IV Intermittent every 8 hours    MEDICATIONS  (PRN):  dextrose Oral Gel 15 Gram(s) Oral once PRN Blood Glucose LESS THAN 70 milliGRAM(s)/deciliter      T(C): 36.5 (12-19-24 @ 13:46), Max: 37.1 (12-19-24 @ 09:39)  HR: 70 (12-19-24 @ 13:46) (61 - 104)  BP: 123/79 (12-19-24 @ 13:46) (122/59 - 151/103)  RR: 18 (12-19-24 @ 13:46) (17 - 18)  SpO2: 99% (12-19-24 @ 13:46) (97% - 100%)  Wt(kg): --    PE  NAD  Awake, alert  Anicteric, MMM  RRR  CTAB  Abd soft, NT, ND                          13.4   7.03  )-----------( 296      ( 19 Dec 2024 06:00 )             40.1       12-19    138  |  102  |  8   ----------------------------<  110[H]  3.6   |  23  |  0.72    Ca    8.7      19 Dec 2024 06:00  Phos  3.1     12-19  Mg     2.20     12-19    TPro  6.8  /  Alb  3.6  /  TBili  0.4  /  DBili  <0.2  /  AST  10  /  ALT  11  /  AlkPhos  115  12-19      CT c/a/p 12/18/24  IMPRESSION:   1. New phlegmon/developing abscess along left margin of the colorectal anastomosis, which raises concern   for the possibility of anastomotic leak.

## 2024-12-19 NOTE — DISCHARGE NOTE PROVIDER - CARE PROVIDER_API CALL
Boris Stark  Surgery  [address] [facility] [place]  [place] [address]  Phone: [phone]  Fax: [phone]  Follow Up Time: 2 weeks

## 2024-12-19 NOTE — DISCHARGE NOTE PROVIDER - NSDCCPCAREPLAN_GEN_ALL_CORE_FT
PRINCIPAL DISCHARGE DIAGNOSIS  Diagnosis: Rectal abscess  Assessment and Plan of Treatment:   PROCEDURE:  CT of the Abdomen and Pelvis was performed.  Sagittal and coronal reformats were performed.  FINDINGS:  LOWER CHEST: Within normal limits.  LIVER: Within normal limits.  BILE DUCTS: Normal caliber.  GALLBLADDER: Within normal limits.  SPLEEN: Within normal limits.  PANCREAS: Within normal limits.  ADRENALS: Within normal limits.  KIDNEYS/URETERS: Within normal limits.  BLADDER: Within normal limits.  REPRODUCTIVE ORGANS: Prostate within normal limits.  BOWEL: No bowel obstruction. Appendix is normal. Rectal anastomosis.   Mural thickening, adjacent fatty stranding at the site of anastomosis. A   2 cm fluid density that extends to the left of the sutures (2, 128) with   adjacent complex fluid. A contrast containing outpouching to the right of   the anastomosis (2, 124). Immediately inferior to this outpouching there   is a fluid containing outpouching of 1.6 cm (2, 129).  PERITONEUM/RETROPERITONEUM: Within normal limits.  VESSELS: Within normal limits.  LYMPH NODES: No lymphadenopathy.  ABDOMINAL WALL: Within normal limits.  BONES: Within normal limits.  IMPRESSION:  Multiple outpouchings at the site of rectal anastomosis. No extravasation   of oral contrast. The outpouching containing contrast may represent a   diverticulum or postsurgical change. Findings can be infectious with a   possible intramuralcollection to the left of the anastomosis. Recurrence   of disease is not entirely excluded. Short-term follow-up exam is advised   once patient's current clinical issues resolve.

## 2025-01-24 ENCOUNTER — NON-APPOINTMENT (OUTPATIENT)
Age: 30
End: 2025-01-24

## 2025-01-26 ENCOUNTER — EMERGENCY (EMERGENCY)
Facility: HOSPITAL | Age: 30
LOS: 1 days | Discharge: HOME | End: 2025-01-26
Attending: EMERGENCY MEDICINE | Admitting: EMERGENCY MEDICINE
Payer: MEDICAID

## 2025-01-26 VITALS
DIASTOLIC BLOOD PRESSURE: 94 MMHG | TEMPERATURE: 98 F | RESPIRATION RATE: 16 BRPM | HEART RATE: 97 BPM | SYSTOLIC BLOOD PRESSURE: 139 MMHG | WEIGHT: 237 LBS | OXYGEN SATURATION: 99 % | HEIGHT: 69 IN

## 2025-01-26 VITALS
SYSTOLIC BLOOD PRESSURE: 117 MMHG | OXYGEN SATURATION: 100 % | RESPIRATION RATE: 18 BRPM | HEART RATE: 79 BPM | DIASTOLIC BLOOD PRESSURE: 76 MMHG | TEMPERATURE: 98 F

## 2025-01-26 DIAGNOSIS — Z90.49 ACQUIRED ABSENCE OF OTHER SPECIFIED PARTS OF DIGESTIVE TRACT: Chronic | ICD-10-CM

## 2025-01-26 DIAGNOSIS — Z98.890 OTHER SPECIFIED POSTPROCEDURAL STATES: Chronic | ICD-10-CM

## 2025-01-26 LAB
ALBUMIN SERPL ELPH-MCNC: 4 G/DL — SIGNIFICANT CHANGE UP (ref 3.3–5)
ALP SERPL-CCNC: 155 U/L — HIGH (ref 40–120)
ALT FLD-CCNC: 18 U/L — SIGNIFICANT CHANGE UP (ref 4–41)
ANION GAP SERPL CALC-SCNC: 12 MMOL/L — SIGNIFICANT CHANGE UP (ref 7–14)
AST SERPL-CCNC: 15 U/L — SIGNIFICANT CHANGE UP (ref 4–40)
BASOPHILS # BLD AUTO: 0.05 K/UL — SIGNIFICANT CHANGE UP (ref 0–0.2)
BASOPHILS NFR BLD AUTO: 0.5 % — SIGNIFICANT CHANGE UP (ref 0–2)
BILIRUB SERPL-MCNC: 0.4 MG/DL — SIGNIFICANT CHANGE UP (ref 0.2–1.2)
BLOOD GAS VENOUS COMPREHENSIVE RESULT: SIGNIFICANT CHANGE UP
BUN SERPL-MCNC: 16 MG/DL — SIGNIFICANT CHANGE UP (ref 7–23)
CALCIUM SERPL-MCNC: 8.9 MG/DL — SIGNIFICANT CHANGE UP (ref 8.4–10.5)
CHLORIDE SERPL-SCNC: 103 MMOL/L — SIGNIFICANT CHANGE UP (ref 98–107)
CO2 SERPL-SCNC: 24 MMOL/L — SIGNIFICANT CHANGE UP (ref 22–31)
CREAT SERPL-MCNC: 0.66 MG/DL — SIGNIFICANT CHANGE UP (ref 0.5–1.3)
EGFR: 130 ML/MIN/1.73M2 — SIGNIFICANT CHANGE UP
EOSINOPHIL # BLD AUTO: 0.06 K/UL — SIGNIFICANT CHANGE UP (ref 0–0.5)
EOSINOPHIL NFR BLD AUTO: 0.6 % — SIGNIFICANT CHANGE UP (ref 0–6)
GLUCOSE SERPL-MCNC: 219 MG/DL — HIGH (ref 70–99)
HCT VFR BLD CALC: 43.4 % — SIGNIFICANT CHANGE UP (ref 39–50)
HCV AB S/CO SERPL IA: 0.17 S/CO — SIGNIFICANT CHANGE UP (ref 0–0.99)
HCV AB SERPL-IMP: SIGNIFICANT CHANGE UP
HGB BLD-MCNC: 14.4 G/DL — SIGNIFICANT CHANGE UP (ref 13–17)
HIV 1+2 AB+HIV1 P24 AG SERPL QL IA: SIGNIFICANT CHANGE UP
IANC: 7.41 K/UL — HIGH (ref 1.8–7.4)
IMM GRANULOCYTES NFR BLD AUTO: 0.4 % — SIGNIFICANT CHANGE UP (ref 0–0.9)
LYMPHOCYTES # BLD AUTO: 1.98 K/UL — SIGNIFICANT CHANGE UP (ref 1–3.3)
LYMPHOCYTES # BLD AUTO: 19.4 % — SIGNIFICANT CHANGE UP (ref 13–44)
MCHC RBC-ENTMCNC: 26.6 PG — LOW (ref 27–34)
MCHC RBC-ENTMCNC: 33.2 G/DL — SIGNIFICANT CHANGE UP (ref 32–36)
MCV RBC AUTO: 80.2 FL — SIGNIFICANT CHANGE UP (ref 80–100)
MONOCYTES # BLD AUTO: 0.65 K/UL — SIGNIFICANT CHANGE UP (ref 0–0.9)
MONOCYTES NFR BLD AUTO: 6.4 % — SIGNIFICANT CHANGE UP (ref 2–14)
NEUTROPHILS # BLD AUTO: 7.41 K/UL — HIGH (ref 1.8–7.4)
NEUTROPHILS NFR BLD AUTO: 72.7 % — SIGNIFICANT CHANGE UP (ref 43–77)
NRBC # BLD: 0 /100 WBCS — SIGNIFICANT CHANGE UP (ref 0–0)
NRBC # FLD: 0 K/UL — SIGNIFICANT CHANGE UP (ref 0–0)
PLATELET # BLD AUTO: 290 K/UL — SIGNIFICANT CHANGE UP (ref 150–400)
POTASSIUM SERPL-MCNC: 4.3 MMOL/L — SIGNIFICANT CHANGE UP (ref 3.5–5.3)
POTASSIUM SERPL-SCNC: 4.3 MMOL/L — SIGNIFICANT CHANGE UP (ref 3.5–5.3)
PROT SERPL-MCNC: 7.8 G/DL — SIGNIFICANT CHANGE UP (ref 6–8.3)
RBC # BLD: 5.41 M/UL — SIGNIFICANT CHANGE UP (ref 4.2–5.8)
RBC # FLD: 13.3 % — SIGNIFICANT CHANGE UP (ref 10.3–14.5)
SODIUM SERPL-SCNC: 139 MMOL/L — SIGNIFICANT CHANGE UP (ref 135–145)
WBC # BLD: 10.19 K/UL — SIGNIFICANT CHANGE UP (ref 3.8–10.5)
WBC # FLD AUTO: 10.19 K/UL — SIGNIFICANT CHANGE UP (ref 3.8–10.5)

## 2025-01-26 PROCEDURE — 99285 EMERGENCY DEPT VISIT HI MDM: CPT

## 2025-01-26 RX ORDER — AMPICILLIN SODIUM AND SULBACTAM SODIUM 100; 50 MG/ML; MG/ML
1.5 INJECTION, POWDER, FOR SOLUTION INTRAVENOUS EVERY 6 HOURS
Refills: 0 | Status: DISCONTINUED | OUTPATIENT
Start: 2025-01-26 | End: 2025-01-26

## 2025-01-26 RX ORDER — AMOXICILLIN/POTASSIUM CLAV 875-125 MG
875 TABLET ORAL
Qty: 20 | Refills: 0
Start: 2025-01-26 | End: 2025-02-04

## 2025-01-26 RX ORDER — AMPICILLIN SODIUM AND SULBACTAM SODIUM 100; 50 MG/ML; MG/ML
1.5 INJECTION, POWDER, FOR SOLUTION INTRAVENOUS ONCE
Refills: 0 | Status: DISCONTINUED | OUTPATIENT
Start: 2025-01-26 | End: 2025-01-26

## 2025-01-26 RX ORDER — DEXAMETHASONE SODIUM PHOSPHATE 4 MG/ML
6 VIAL (ML) INJECTION ONCE
Refills: 0 | Status: COMPLETED | OUTPATIENT
Start: 2025-01-26 | End: 2025-01-26

## 2025-01-26 RX ORDER — CLINDAMYCIN HYDROCHLORIDE 300 MG/1
600 CAPSULE ORAL ONCE
Refills: 0 | Status: COMPLETED | OUTPATIENT
Start: 2025-01-26 | End: 2025-01-26

## 2025-01-26 RX ORDER — LIDOCAINE HYDROCHLORIDE,EPINEPHRINE BITARTRATE 20; .005 MG/ML; MG/ML
10 INJECTION, SOLUTION EPIDURAL; INFILTRATION; INTRACAUDAL; PERINEURAL ONCE
Refills: 0 | Status: COMPLETED | OUTPATIENT
Start: 2025-01-26 | End: 2025-01-26

## 2025-01-26 RX ORDER — AMPICILLIN SODIUM AND SULBACTAM SODIUM 100; 50 MG/ML; MG/ML
INJECTION, POWDER, FOR SOLUTION INTRAVENOUS
Refills: 0 | Status: DISCONTINUED | OUTPATIENT
Start: 2025-01-26 | End: 2025-01-26

## 2025-01-26 RX ORDER — KETOROLAC TROMETHAMINE 30 MG/ML
30 INJECTION INTRAMUSCULAR; INTRAVENOUS ONCE
Refills: 0 | Status: DISCONTINUED | OUTPATIENT
Start: 2025-01-26 | End: 2025-01-26

## 2025-01-26 RX ORDER — SODIUM CHLORIDE 9 MG/ML
1000 INJECTION, SOLUTION INTRAMUSCULAR; INTRAVENOUS; SUBCUTANEOUS ONCE
Refills: 0 | Status: COMPLETED | OUTPATIENT
Start: 2025-01-26 | End: 2025-01-26

## 2025-01-26 RX ADMIN — Medication 6 MILLIGRAM(S): at 11:47

## 2025-01-26 RX ADMIN — KETOROLAC TROMETHAMINE 30 MILLIGRAM(S): 30 INJECTION INTRAMUSCULAR; INTRAVENOUS at 10:49

## 2025-01-26 RX ADMIN — SODIUM CHLORIDE 1000 MILLILITER(S): 9 INJECTION, SOLUTION INTRAMUSCULAR; INTRAVENOUS; SUBCUTANEOUS at 10:48

## 2025-01-26 RX ADMIN — SODIUM CHLORIDE 1000 MILLILITER(S): 9 INJECTION, SOLUTION INTRAMUSCULAR; INTRAVENOUS; SUBCUTANEOUS at 10:49

## 2025-01-26 RX ADMIN — CLINDAMYCIN HYDROCHLORIDE 100 MILLIGRAM(S): 300 CAPSULE ORAL at 11:48

## 2025-01-26 NOTE — ED PROVIDER NOTE - CLINICAL SUMMARY MEDICAL DECISION MAKING FREE TEXT BOX
Pt well appearing but with right throat pain, normal voice, lungs cta, possible strep vs. early peritonsillar abscess, for labs, iv antibiotics and ent consult

## 2025-01-26 NOTE — ED PROVIDER NOTE - OBJECTIVE STATEMENT
Patient is a 29-year-old male with a history of type 2 diabetes and a distant history of colon cancer in remission complaining of throat pain.  History of pain has been going on for a few days and went to an urgent care who gave him amoxicillin and he has taken ibuprofen without much relief. Pt states he tested positive for strep throat.  No fevers.  Able to take p.o. but with pain.  Patient has a history of having abscesses that needed draining in the past per patient.  No shortness of breath and no voice changes.

## 2025-01-26 NOTE — CONSULT NOTE ADULT - SUBJECTIVE AND OBJECTIVE BOX
OTOLARYNGOLOGY (ENT) CONSULTATION NOTE    PATIENT: NGUYỄN GUERRA     MRN: [id number]       : 95  DATE OF ADMISSION:25  DATE OF SERVICE:  25 @ 11:00    HISTORY OF PRESENT ILLNESS:  NGUYỄN GUERRA  is a 29y Male with PMH DM, colon cancer? presenting with odynophagia. ED consulted ENT to r/o PTA. Per patient, has history of PTAs requiring drainage. Has been on amoxicillin for 1 day.      PAST MEDICAL HISTORY:  Acne    Ankle injury    Peritonsillar abscess    Malignant neoplasm of rectosigmoid junction    Injury of metacarpal bone        Vital Signs:  T(C): 36.8 (25 @ 09:55), Max: 36.8 (25 @ 09:55)  HR: 97 (25 @ 09:55) (97 - 97)  BP: 139/94 (25 @ 09:55) (139/94 - 139/94)  RR: 16 (25 @ 09:55) (16 - 16)  SpO2: 99% (25 @ 09:55) (99% - 99%)        [id number]    REVIEW OF SYSTEMS: The patient was asked and responded to a review of systems regarding the following symptoms: constitutional, eye, ears, nose, mouth, throat, cardiovascular, respiratory. Pertinent factors have been included in the HPI.     PHYSICAL EXAMINATION:    RADIOLOGY    ASSESSMENT/PLAN:    IMPRESSION: NGUYỄN GUERRA  is a 29y Male with     RECOMMENDATIONS:

## 2025-01-26 NOTE — ED PROVIDER NOTE - NS ED MD DISPO DISCHARGE
ARB Inhibitor Refill Protocol Failed    Rerun Protocol (10/12/2022 8:31 AM)      Normal Creatinine within last 12 months looking at last value        Creatinine (mg/dL)   Date Value   09/21/2022 1.16 (H)       Creatinine POC (mg/dL)   Date Value   05/28/2019 1.10 (H)       
LOV 9/28/22. Next scheduled 3/29/23.  
Home

## 2025-01-26 NOTE — ED PROVIDER NOTE - PATIENT PORTAL LINK FT
You can access the FollowMyHealth Patient Portal offered by [facility] by registering at the following website: [email or web address]. By joining [employer]’s FollowMyHealth portal, you will also be able to view your health information using other applications (apps) compatible with our system.

## 2025-01-26 NOTE — ED ADULT NURSE NOTE - OBJECTIVE STATEMENT
patient alert ox4 came in c/o strep throat and pain to swallow and abscess to throat. seen by provider, able to swallow solids. denies any SOB. speaking in full sentences, NAD. h/o colon Ca in remission. labs done and meds given as ordered. awaiting results and re eval.

## 2025-01-26 NOTE — ED PROVIDER NOTE - NSFOLLOWUPINSTRUCTIONS_ED_ALL_ED_FT
Pt called back and states she forgot to do labs and will come in one day this week to do   Follow up with ENT within a  few days  You have a throat infection and maybe an early abscess    Augmentin 1 pill twice a day for 10 days  Take motrin 200 mg every 6-8 hours as needed for pain (up to 600 mg) or tylenol 325 mg every 4 hours as needed for pain.  If worse pain, swelling, unable to eat or drink, fever, pus, shortness of breath or any worse symptoms return to the ER

## 2025-07-03 NOTE — ASU PATIENT PROFILE, ADULT - FALL HARM RISK - UNIVERSAL INTERVENTIONS
hide
Bed in lowest position, wheels locked, appropriate side rails in place/Call bell, personal items and telephone in reach/Instruct patient to call for assistance before getting out of bed or chair/Non-slip footwear when patient is out of bed/[place] to call system/Physically safe environment - no spills, clutter or unnecessary equipment/Purposeful Proactive Rounding/Room/bathroom lighting operational, light cord in reach

## (undated) DEVICE — SUT SILK 3-0 18" SH (POP-OFF)

## (undated) DEVICE — SUT VICRYL 2-0 18" TIES UNDYED

## (undated) DEVICE — PREP BETADINE KIT

## (undated) DEVICE — SUT VICRYL 3-0 27" SH UNDYED

## (undated) DEVICE — WARMING BLANKET LOWER ADULT

## (undated) DEVICE — PREP CHLORAPREP HI-LITE ORANGE 26ML

## (undated) DEVICE — CANISTER DISPOSABLE THIN WALL 3000CC

## (undated) DEVICE — ELCTR BOVIE BLADE 3/4" EXTENDED LENGTH 6"

## (undated) DEVICE — POSITIONER FOAM HEAD CRADLE (PINK)

## (undated) DEVICE — SOLIDIFIER CANN EXPRESS 3K

## (undated) DEVICE — VENODYNE/SCD SLEEVE CALF MEDIUM

## (undated) DEVICE — POSITIONER STRAP ARMBOARD VELCRO TS-30

## (undated) DEVICE — SOL IRR POUR NS 0.9% 1000ML

## (undated) DEVICE — POOLE SUCTION TIP

## (undated) DEVICE — FOLEY CATH 2-WAY 14FR 5CC SILICONE

## (undated) DEVICE — TUBING IRR SET FOR CYSTOSCOPY 77"

## (undated) DEVICE — DRAPE IOBAN 33" X 23"

## (undated) DEVICE — DRSG TEGADERM 4X4.75"

## (undated) DEVICE — POSITIONER FOAM EGG CRATE ULNAR 2PCS (PINK)

## (undated) DEVICE — TOURNIQUET ESMARK 4"

## (undated) DEVICE — GLV 8 PROTEXIS (BLUE)

## (undated) DEVICE — SOL IRR POUR NS 0.9% 1500ML

## (undated) DEVICE — WARMING BLANKET FULL ADULT

## (undated) DEVICE — SUT PROLENE 2-0 30" CT-2

## (undated) DEVICE — PACK MAJOR ABDOMINAL WITH LAP

## (undated) DEVICE — STAPLER SKIN MULTI DIRECTION W35

## (undated) DEVICE — PACK UPPER EXTREMITY

## (undated) DEVICE — FOLEY TRAY 16FR 5CC LF UMETER CLOSED

## (undated) DEVICE — SUT MAXON 1 36" GS-24

## (undated) DEVICE — DRSG WEBRIL 4"

## (undated) DEVICE — PACK PERI GYN

## (undated) DEVICE — SOL IRR POUR H2O 1500ML

## (undated) DEVICE — CANISTER SUCTION LINER 1500 CC

## (undated) DEVICE — GLV 7.5 PROTEXIS (WHITE)

## (undated) DEVICE — LUBRICATING JELLY ONESHOT 1.25OZ

## (undated) DEVICE — LIGASURE IMPACT

## (undated) DEVICE — PROTECTOR HEEL / ELBOW FLUFFY

## (undated) DEVICE — SUT VICRYL 0 27" CT-1 UNDYED

## (undated) DEVICE — Device

## (undated) DEVICE — CANISTER SUCTION LID GUARD 3000CC

## (undated) DEVICE — LABELS BLANK W PEN

## (undated) DEVICE — ELCTR GROUNDING PAD ADULT COVIDIEN

## (undated) DEVICE — ELCTR BOVIE TIP BLADE INSULATED 2.75" EDGE